# Patient Record
Sex: MALE | Race: WHITE
[De-identification: names, ages, dates, MRNs, and addresses within clinical notes are randomized per-mention and may not be internally consistent; named-entity substitution may affect disease eponyms.]

---

## 2021-01-06 ENCOUNTER — HOSPITAL ENCOUNTER (EMERGENCY)
Dept: HOSPITAL 9 - MADERS | Age: 81
Discharge: ANOTHER HOSPITAL | End: 2021-01-06
Payer: MEDICARE

## 2021-01-06 ENCOUNTER — HOSPITAL ENCOUNTER (INPATIENT)
Dept: HOSPITAL 92 - ERS | Age: 81
LOS: 12 days | Discharge: HOSPICE | DRG: 871 | End: 2021-01-18
Attending: INTERNAL MEDICINE | Admitting: STUDENT IN AN ORGANIZED HEALTH CARE EDUCATION/TRAINING PROGRAM
Payer: MEDICARE

## 2021-01-06 VITALS — BODY MASS INDEX: 23.2 KG/M2

## 2021-01-06 DIAGNOSIS — J12.82: ICD-10-CM

## 2021-01-06 DIAGNOSIS — I10: ICD-10-CM

## 2021-01-06 DIAGNOSIS — Z79.01: ICD-10-CM

## 2021-01-06 DIAGNOSIS — I21.A1: ICD-10-CM

## 2021-01-06 DIAGNOSIS — Z66: ICD-10-CM

## 2021-01-06 DIAGNOSIS — Z79.899: ICD-10-CM

## 2021-01-06 DIAGNOSIS — R13.10: ICD-10-CM

## 2021-01-06 DIAGNOSIS — I48.91: ICD-10-CM

## 2021-01-06 DIAGNOSIS — Z86.73: ICD-10-CM

## 2021-01-06 DIAGNOSIS — G93.41: ICD-10-CM

## 2021-01-06 DIAGNOSIS — F03.90: ICD-10-CM

## 2021-01-06 DIAGNOSIS — R65.20: ICD-10-CM

## 2021-01-06 DIAGNOSIS — E86.0: ICD-10-CM

## 2021-01-06 DIAGNOSIS — M19.90: ICD-10-CM

## 2021-01-06 DIAGNOSIS — I13.0: ICD-10-CM

## 2021-01-06 DIAGNOSIS — E87.5: ICD-10-CM

## 2021-01-06 DIAGNOSIS — N40.0: ICD-10-CM

## 2021-01-06 DIAGNOSIS — J96.01: ICD-10-CM

## 2021-01-06 DIAGNOSIS — I50.9: ICD-10-CM

## 2021-01-06 DIAGNOSIS — R73.9: ICD-10-CM

## 2021-01-06 DIAGNOSIS — Z88.8: ICD-10-CM

## 2021-01-06 DIAGNOSIS — I25.10: ICD-10-CM

## 2021-01-06 DIAGNOSIS — A41.89: Primary | ICD-10-CM

## 2021-01-06 DIAGNOSIS — R41.0: ICD-10-CM

## 2021-01-06 DIAGNOSIS — E43: ICD-10-CM

## 2021-01-06 DIAGNOSIS — N17.9: ICD-10-CM

## 2021-01-06 DIAGNOSIS — Z88.0: ICD-10-CM

## 2021-01-06 DIAGNOSIS — U07.1: Primary | ICD-10-CM

## 2021-01-06 DIAGNOSIS — N18.30: ICD-10-CM

## 2021-01-06 DIAGNOSIS — E87.0: ICD-10-CM

## 2021-01-06 DIAGNOSIS — Z51.5: ICD-10-CM

## 2021-01-06 DIAGNOSIS — U07.1: ICD-10-CM

## 2021-01-06 DIAGNOSIS — Z95.0: ICD-10-CM

## 2021-01-06 LAB
ALBUMIN SERPL BCG-MCNC: 3.5 G/DL (ref 3.4–4.8)
ALP SERPL-CCNC: 62 U/L (ref 40–110)
ALT SERPL W P-5'-P-CCNC: 64 U/L (ref 8–55)
ANION GAP SERPL CALC-SCNC: 17 MMOL/L (ref 10–20)
ANISOCYTOSIS BLD QL SMEAR: (no result) (100X)
APTT PPP: 35.8 SEC (ref 22.9–36.1)
AST SERPL-CCNC: 59 U/L (ref 5–34)
BACTERIA UR QL AUTO: (no result) HPF
BASOPHILS # BLD AUTO: 0 THOU/UL (ref 0–0.2)
BASOPHILS NFR BLD AUTO: 0.7 % (ref 0–1)
BILIRUB SERPL-MCNC: 0.7 MG/DL (ref 0.2–1.2)
BUN SERPL-MCNC: 48 MG/DL (ref 8.4–25.7)
CALCIUM SERPL-MCNC: 8 MG/DL (ref 7.8–10.44)
CHLORIDE SERPL-SCNC: 103 MMOL/L (ref 98–107)
CK MB SERPL-MCNC: 1.1 NG/ML (ref 0–6.6)
CK SERPL-CCNC: 106 U/L (ref 30–200)
CO2 SERPL-SCNC: 23 MMOL/L (ref 23–31)
CREAT CL PREDICTED SERPL C-G-VRATE: 0 ML/MIN (ref 70–130)
EOSINOPHIL # BLD AUTO: 0 THOU/UL (ref 0–0.7)
EOSINOPHIL NFR BLD AUTO: 0 % (ref 0–10)
GLOBULIN SER CALC-MCNC: 3.6 G/DL (ref 2.4–3.5)
GLUCOSE SERPL-MCNC: 169 MG/DL (ref 83–110)
HGB BLD-MCNC: 13.6 G/DL (ref 14–18)
INR PPP: 1.6
LIPASE SERPL-CCNC: 24 U/L (ref 8–78)
LYMPHOCYTES # BLD AUTO: 0 THOU/UL (ref 1.2–3.4)
LYMPHOCYTES NFR BLD AUTO: 7.4 % (ref 21–51)
MACROCYTES BLD QL SMEAR: (no result) (100X)
MCH RBC QN AUTO: 31.4 PG (ref 27–31)
MCV RBC AUTO: 96.3 FL (ref 78–98)
MICROCYTES BLD QL SMEAR: (no result) (100X)
MONOCYTES # BLD AUTO: 0.6 THOU/UL (ref 0.11–0.59)
MONOCYTES NFR BLD AUTO: 9.1 % (ref 0–10)
NEUTROPHILS # BLD AUTO: 5.8 THOU/UL (ref 1.4–6.5)
NEUTROPHILS NFR BLD AUTO: 82.7 % (ref 42–75)
PLATELET # BLD AUTO: 141 THOU/UL (ref 130–400)
POTASSIUM SERPL-SCNC: 4.1 MMOL/L (ref 3.5–5.1)
PROT UR STRIP.AUTO-MCNC: 100 MG/DL
PROTHROMBIN TIME: 19.2 SEC (ref 12–14.7)
RBC # BLD AUTO: 4.33 MILL/UL (ref 4.7–6.1)
RBC UR QL AUTO: (no result) HPF (ref 0–3)
SODIUM SERPL-SCNC: 139 MMOL/L (ref 136–145)
SP GR UR STRIP: 1.02 (ref 1–1.03)
WBC # BLD AUTO: 7 THOU/UL (ref 4.8–10.8)
WBC UR QL AUTO: (no result) HPF (ref 0–3)

## 2021-01-06 PROCEDURE — 36416 COLLJ CAPILLARY BLOOD SPEC: CPT

## 2021-01-06 PROCEDURE — 85610 PROTHROMBIN TIME: CPT

## 2021-01-06 PROCEDURE — 81001 URINALYSIS AUTO W/SCOPE: CPT

## 2021-01-06 PROCEDURE — 93010 ELECTROCARDIOGRAM REPORT: CPT

## 2021-01-06 PROCEDURE — 83605 ASSAY OF LACTIC ACID: CPT

## 2021-01-06 PROCEDURE — 85025 COMPLETE CBC W/AUTO DIFF WBC: CPT

## 2021-01-06 PROCEDURE — 80053 COMPREHEN METABOLIC PANEL: CPT

## 2021-01-06 PROCEDURE — 71045 X-RAY EXAM CHEST 1 VIEW: CPT

## 2021-01-06 PROCEDURE — 80048 BASIC METABOLIC PNL TOTAL CA: CPT

## 2021-01-06 PROCEDURE — 93005 ELECTROCARDIOGRAM TRACING: CPT

## 2021-01-06 PROCEDURE — 82553 CREATINE MB FRACTION: CPT

## 2021-01-06 PROCEDURE — 0240U: CPT

## 2021-01-06 PROCEDURE — 85730 THROMBOPLASTIN TIME PARTIAL: CPT

## 2021-01-06 PROCEDURE — 84484 ASSAY OF TROPONIN QUANT: CPT

## 2021-01-06 PROCEDURE — 87086 URINE CULTURE/COLONY COUNT: CPT

## 2021-01-06 PROCEDURE — 81003 URINALYSIS AUTO W/O SCOPE: CPT

## 2021-01-06 PROCEDURE — 51701 INSERT BLADDER CATHETER: CPT

## 2021-01-06 PROCEDURE — 87804 INFLUENZA ASSAY W/OPTIC: CPT

## 2021-01-06 PROCEDURE — 96367 TX/PROPH/DG ADDL SEQ IV INF: CPT

## 2021-01-06 PROCEDURE — 85027 COMPLETE CBC AUTOMATED: CPT

## 2021-01-06 PROCEDURE — 81015 MICROSCOPIC EXAM OF URINE: CPT

## 2021-01-06 PROCEDURE — 86140 C-REACTIVE PROTEIN: CPT

## 2021-01-06 PROCEDURE — 96365 THER/PROPH/DIAG IV INF INIT: CPT

## 2021-01-06 PROCEDURE — 84443 ASSAY THYROID STIM HORMONE: CPT

## 2021-01-06 PROCEDURE — 83735 ASSAY OF MAGNESIUM: CPT

## 2021-01-06 PROCEDURE — 94760 N-INVAS EAR/PLS OXIMETRY 1: CPT

## 2021-01-06 PROCEDURE — 82728 ASSAY OF FERRITIN: CPT

## 2021-01-06 PROCEDURE — 82550 ASSAY OF CK (CPK): CPT

## 2021-01-06 PROCEDURE — 87040 BLOOD CULTURE FOR BACTERIA: CPT

## 2021-01-06 PROCEDURE — 83690 ASSAY OF LIPASE: CPT

## 2021-01-06 PROCEDURE — 80202 ASSAY OF VANCOMYCIN: CPT

## 2021-01-06 PROCEDURE — 70450 CT HEAD/BRAIN W/O DYE: CPT

## 2021-01-06 PROCEDURE — 36415 COLL VENOUS BLD VENIPUNCTURE: CPT

## 2021-01-06 PROCEDURE — 83880 ASSAY OF NATRIURETIC PEPTIDE: CPT

## 2021-01-06 PROCEDURE — 96375 TX/PRO/DX INJ NEW DRUG ADDON: CPT

## 2021-01-06 NOTE — RAD
Exam: Chest one view



HISTORY:Altered mental status



Comparison: 3/17/2020



FINDINGS:

Cardiac silhouette:Cardiomegaly. Stable left-sided transvenous defibrillator.

Aorta: Atherosclerosis and slight elongation

Pulmonary vessels: Normal

Costophrenic angles: Clear



LUNGS: Scattered interstitial opacities throughout the lung parenchyma with a more focal consolidatio
n in the left upper lobe.

Pneumothorax: None



Osseous abnormalities: None



IMPRESSION: 

1. Left upper lobe pneumonia.

2. Scattered interstitial opacities may represent interstitial edema due to congestive heart failure.




Reported By: Akilah Lott 

Electronically Signed:  1/6/2021 9:32 PM

## 2021-01-06 NOTE — CT
Exam: Head CT without contrast





HISTORY: Fall today. Loss of consciousness. Altered mental status.



COMPARISON: 3/7/2017





FINDINGS:

Hemorrhage: No intraparenchymal hemorrhage or extra-axial hematoma.



Brain parenchyma: Cortical gray-white matter differentiation is preserved. No mass effect or midline 
shift. Basilar cisterns are patent.Age-appropriate atrophy. There are chronic small vessel ischemic

changes of the white matter. Remote lacunar infarct involving the posterior left corona radiata

Ventricular system: Ventricles and sulci are patent and symmetric.

Calvarium: Intact.

Sinuses and mastoid air cells: Adequate aeration.



IMPRESSION:



1. No intracranial posttraumatic sequelae

2. Chronic small vessel ischemic changes white matter. Age-appropriate atrophy.







Reported By: Akilah Lott 

Electronically Signed:  1/6/2021 9:35 PM

## 2021-01-07 LAB
ANION GAP SERPL CALC-SCNC: 19 MMOL/L (ref 10–20)
BASOPHILS # BLD AUTO: 0 THOU/UL (ref 0–0.2)
BASOPHILS NFR BLD AUTO: 0.2 % (ref 0–1)
BUN SERPL-MCNC: 48 MG/DL (ref 8.4–25.7)
CALCIUM SERPL-MCNC: 7.6 MG/DL (ref 7.8–10.44)
CHLORIDE SERPL-SCNC: 107 MMOL/L (ref 98–107)
CO2 SERPL-SCNC: 17 MMOL/L (ref 23–31)
CREAT CL PREDICTED SERPL C-G-VRATE: 34 ML/MIN (ref 70–130)
EOSINOPHIL # BLD AUTO: 0 THOU/UL (ref 0–0.7)
EOSINOPHIL NFR BLD AUTO: 0.2 % (ref 0–10)
GLUCOSE SERPL-MCNC: 179 MG/DL (ref 83–110)
HGB BLD-MCNC: 13.5 G/DL (ref 14–18)
LYMPHOCYTES # BLD: 0.5 THOU/UL (ref 1.2–3.4)
LYMPHOCYTES NFR BLD AUTO: 8.5 % (ref 21–51)
MCH RBC QN AUTO: 32.2 PG (ref 27–31)
MCV RBC AUTO: 98.9 FL (ref 78–98)
MONOCYTES # BLD AUTO: 0.2 THOU/UL (ref 0.11–0.59)
MONOCYTES NFR BLD AUTO: 3.4 % (ref 0–10)
NEUTROPHILS # BLD AUTO: 5.2 THOU/UL (ref 1.4–6.5)
NEUTROPHILS NFR BLD AUTO: 87.8 % (ref 42–75)
PLATELET # BLD AUTO: 125 THOU/UL (ref 130–400)
POTASSIUM SERPL-SCNC: 3.8 MMOL/L (ref 3.5–5.1)
RBC # BLD AUTO: 4.18 MILL/UL (ref 4.7–6.1)
SARS-COV-2 RNA RESP QL NAA+PROBE: DETECTED
SODIUM SERPL-SCNC: 139 MMOL/L (ref 136–145)
TROPONIN I SERPL DL<=0.01 NG/ML-MCNC: 0.16 NG/ML (ref ?–0.03)
TROPONIN I SERPL DL<=0.01 NG/ML-MCNC: 0.2 NG/ML (ref ?–0.03)
WBC # BLD AUTO: 6 THOU/UL (ref 4.8–10.8)

## 2021-01-07 PROCEDURE — 8E0ZXY6 ISOLATION: ICD-10-PCS | Performed by: EMERGENCY MEDICINE

## 2021-01-07 NOTE — PDOC.HHP
Hospitalist HPI





- History of Present Illness


Altered mental status


History of Present Illness: 





This is an 80-year-old male patient with a history of atrial fibrillation, 

hypertension, dementia Who was transferred from [facility] on account

of left upper lobe pneumonia and acute encephalopathy.  He also tested positive 

for Covid.  Patient is noted to have been recently discharged from [facility] with possible CVA.





Patient was taken by EMS from his home to [facility] on account of 

altered mental status.  He was noted to have had a fever and occasional cough 

and was diagnosed with urinary tract infection about a week ago.  At 

[place] was noted to be somnolent but arousable presenting blood pressure 

was 135/101, pulse 100, respiratory rate 20, temperature 103.8 and saturation 96

on 4 L oxygen.  His labs showed creatinine of 1.99 from baseline of 1.27, 

lactate was 2.4, troponin was 0.132.


CT brain showed no acute intracranial event, chest x-ray showed left upper lobe 

pneumonia and scattered interstitial opacities mainly representing interstitial 

edema due to CHF.  At [facility] he was given aspirin 300 mg rectally, 

vancomycin 20 mils per KG, cefepime 2 g and Decadron 10 mg.


He was transferred here for higher level care.





At presentation here his blood pressure was 140/98, pulse 98, respiratory rate 

28, temperature 99.2 and saturation 100% on 3 L oxygen.


At the time of my evaluation patient was in bed although responds to his name 

but does not answer any other question.


He has otherwise been generally stable.











Hospitalist ROS





- Review of Systems


ROS unobtainable: due to mental status





Hospitalist History





- Past Medical History


Other Medical History: 





atrial fibrillation, hypertension, dementia, CHF





- Past Surgical History


Other Surgical History: 





Tonsillectomy, vasectomy





- Family History


Family History: reports: no pertinent history





- Social History


Smoking Status: Unknown if ever smoked


Living Situation: With Family





- Exam


General Appearance: ill appearing


General - other findings: oriented only to self


Eye: PERRL, anicteric sclera


Heart: RRR, no murmur, no gallops, no rubs


Respiratory - other findings: Reduced air movemnt, occasional wheezes


Gastrointestinal: soft, non-distended, normal bowel sounds


Extremities: no cyanosis, no clubbing, no edema


Neurological: cranial nerve grossly intact, no focal deficits


Psychiatric: oriented to person, somnolent





Hospitalist Results





- Labs


Result Diagrams: 


                                 01/10/21 04:43





                                 01/10/21 04:43


Lab results: 


                                        











Troponin I  0.196 ng/mL (< 0.028)  H  01/07/21  01:09    














Hospitalist H&P A/P





- Plan


Plan: 





This is a 80-year-old male patient with a history of dementia, hypertension and 

atrial fibrillation transferred from [facility] on account of sepsis 

secondary to pneumonia in the setting of COVID-19 infection.





Acute hypoxic respiratory failure.


Patient saturating normally on 3 L.


This likely due to Covid pneumonia with possible CAP, mild CHF exacerbation


Currently received vancomycin and cefepime


Continue oxygen therapy


Pulmonology consult if deteriorates.





Severe sepsis


Patient has elevated troponin lactate, altered mental status, HERMAN with septic 

source being lung.


With tachypnea and fever


Received gentle hydration on account of history of heart failure. chest xray 

shows some congestion


We will trend lactate


Continue vancomycin and cefepime


Follow-up on culture





Pneumonia due to Covid


We will start steroids and vitamins


HERMAN precludes remdesivir.


We will monitor ferritin and CRP





HERMAN


Creatinine elevated at 1.99 from a baseline of 1.27-year ago.


Possibly prerenal


This could be chronic however.


Continue gentle hydration


Monitor BMP


Nephrology consult if deteriorates.





NSTEMI


Troponin elevated 0.132


This could be related to her renal function


No concerning EKG changes


We will trend troponin


Received aspirin


Consider cardiology consult in a.m.





-Acute encephalopathy


due to covid/sepsis


treat underlying


unclear what patients baseline is at the [facility]





Atrial fibrillation


Patient anticoagulated with apixaban


We will resume apixaban once verified.





-CHF exacerbation


mild


will hold lasix on account of sepsis


echocardiogram in am to determine EF


diurese if indicated





Dementia





-recent CVA





-Poor overall prognosis


due to reduced likelihood of survival, will consult palliative care for goals of

care discussion





VT prophylaxisrestart apixaban once verified


CODE STATUSto be discussed.


From his notes, patient's family is wary of intubation however more discussion 

have to be had


Consider palliative consult in a.m. for goals of care discussion

## 2021-01-07 NOTE — PDOC.BPN
- Brief Progress Note


Encounter Date: 01/07/21


Encounter Time: 16:00











F/u : COVID








The patient i slaying in bed, diuresed significantly.  He is on 2L of oxygen, 

nurse will attempt to wean. He did pass bedside swallow eval





He denies complaints but doesn't talk much 





General: alert, not very verbal


CVS: RRR, no murmurs, rubs, gallops


Lungs: CTAB


Abdomen: +BS, soft, nontender, nondistended


Extremities: no edema





Chest X ray: left upper lobe pnuemonia and heart failure





This is an 80 year old male who presented with altered mental status, found to 

have pneumonia








Acute hypoxic respiratory failure secondary to pneumonia and pulmonary edema


- continue IV cefepime.  BNP  > 3000. Chest Xray shows interstitial changes. 

Ordered 40 mg IV lasix x 1








HERMAN


- creatinine improved from 1.9 to 1.89. Repeat BMP tomorrow . Repeat UA





Elevated troponin


- mild elevation, downtrending. Could be from HERMAN. Will monitor








Hypertension 


- resume coreg





COVID+


- continue dexamethasone

## 2021-01-07 NOTE — RAD
Exam: Chest one view



HISTORY:Evaluate pulmonary edema. Follow-up exam.



Comparison: 1/6/2021



FINDINGS:

Cardiac silhouette:Stable cardiomegaly. Stable left-sided defibrillator.

Aorta: Stable atherosclerosis

Pulmonary vessels: Normal

Costophrenic angles: Clear



LUNGS: Stable multifocal interstitial and alveolar opacities.

Pneumothorax: None



Osseous abnormalities: None



IMPRESSION: No significant interval change.



Reported By: Akilah Lott 

Electronically Signed:  1/7/2021 7:09 PM

## 2021-01-08 LAB
ANION GAP SERPL CALC-SCNC: 19 MMOL/L (ref 10–20)
BASOPHILS # BLD AUTO: 0 THOU/UL (ref 0–0.2)
BASOPHILS NFR BLD AUTO: 0 % (ref 0–1)
BUN SERPL-MCNC: 51 MG/DL (ref 8.4–25.7)
CALCIUM SERPL-MCNC: 8.1 MG/DL (ref 7.8–10.44)
CHLORIDE SERPL-SCNC: 108 MMOL/L (ref 98–107)
CO2 SERPL-SCNC: 21 MMOL/L (ref 23–31)
CREAT CL PREDICTED SERPL C-G-VRATE: 31 ML/MIN (ref 70–130)
EOSINOPHIL # BLD AUTO: 0 THOU/UL (ref 0–0.7)
EOSINOPHIL NFR BLD AUTO: 0.2 % (ref 0–10)
GLUCOSE SERPL-MCNC: 228 MG/DL (ref 83–110)
HGB BLD-MCNC: 13.3 G/DL (ref 14–18)
LYMPHOCYTES # BLD: 0.4 THOU/UL (ref 1.2–3.4)
LYMPHOCYTES NFR BLD AUTO: 3.1 % (ref 21–51)
MCH RBC QN AUTO: 32.2 PG (ref 27–31)
MCV RBC AUTO: 98 FL (ref 78–98)
MONOCYTES # BLD AUTO: 0.8 THOU/UL (ref 0.11–0.59)
MONOCYTES NFR BLD AUTO: 5.5 % (ref 0–10)
NEUTROPHILS # BLD AUTO: 12.5 THOU/UL (ref 1.4–6.5)
NEUTROPHILS NFR BLD AUTO: 91.2 % (ref 42–75)
PLATELET # BLD AUTO: 138 THOU/UL (ref 130–400)
POTASSIUM SERPL-SCNC: 3.6 MMOL/L (ref 3.5–5.1)
PROT UR STRIP.AUTO-MCNC: 100 MG/DL
RBC # BLD AUTO: 4.15 MILL/UL (ref 4.7–6.1)
RBC UR QL AUTO: (no result) HPF (ref 0–3)
SODIUM SERPL-SCNC: 144 MMOL/L (ref 136–145)
SP GR UR STRIP: 1.02 (ref 1–1.04)
WBC # BLD AUTO: 13.7 THOU/UL (ref 4.8–10.8)
WBC UR QL AUTO: (no result) HPF (ref 0–3)

## 2021-01-08 NOTE — PDOC.HOSPP
- Subjective


Encounter Date: 01/08/21


Encounter Time: 16:58


Subjective: 





F/u : COVID








The patient has been weaned down to 1L nasal cannula. He is more alert.  His 

diet has been upgraded to a pureed diet and he was seen sitting in bed 

comfortably eating. 





He is non-verbal .  He denies pain 








- Objective


Vital Signs & Weight: 


                             Vital Signs (12 hours)











  Temp Pulse Resp BP Pulse Ox


 


 01/08/21 16:20  98 F  108 H  24 H  131/85  100


 


 01/08/21 11:28  97.6 F  95  22 H  147/89 H  100


 


 01/08/21 09:10      100


 


 01/08/21 07:37  98.8 F  100  18  125/85  100


 


 01/08/21 07:27  98.8 F  100  18  125/85  100


 


 01/08/21 05:20  99.9 F H    








                                     Weight











Admit Weight                   174 lb


 


Weight                         174 lb














I&O: 


                                        











 01/07/21 01/08/21 01/09/21





 06:59 06:59 06:59


 


Intake Total  240 


 


Balance  240 











Result Diagrams: 


                                 01/08/21 04:32





                                 01/08/21 04:32





Hospitalist ROS





- Review of Systems


Constitutional: denies: fever, chills





- Medication


Medications: 


Active Medications











Generic Name Dose Route Start Last Admin





  Trade Name Freq  PRN Reason Stop Dose Admin


 


Acetaminophen  650 mg  01/08/21 03:35  01/08/21 03:44





  Acetaminophen 325 Mg Tab  PO   650 mg





  Q6H PRN   Administration





  Fever/Mild Pain  


 


Carvedilol  6.25 mg  01/07/21 17:00  01/08/21 16:11





  Carvedilol 6.25 Mg Tab  PO   6.25 mg





  BID-WM JULIO   Administration


 


Dexamethasone  8 mg  01/07/21 09:00  01/08/21 09:10





  Dexamethasone 4 Mg/Ml Vial  SLOW IVP   8 mg





  DAILY JULIO   Administration














- Exam


General Appearance: NAD, awake alert


Eye: PERRL, anicteric sclera


ENT: normocephalic atraumatic, no oropharyngeal lesions


Neck: no JVD


Heart: RRR, no murmur, no gallops, no rubs


Respiratory: CTAB, no wheezes, no rales, no ronchi


Respiratory - other findings: on oxygen 1L 


Gastrointestinal: soft, non-tender, non-distended, normal bowel sounds


Extremities: no cyanosis, no clubbing, no edema


Skin: normal turgor, no lesions, no rashes


Neurological: cranial nerve grossly intact, normal sensation to touch, no 

weakness


Musculoskeletal: normal tone, normal strength, no muscle wasting


Psychiatric: A&O x 3





Hosp A/P





- Plan











This is an 80 year old male who presented to the hospital with hypoxia and 

altered mental status. He is COVID+





Acute hypoxic respiratory failure secondary to COVID  pneumonia and pulmonary 

edema


- continue IV cefepime.  BNP  > 3000. Chest Xray shows interstitial changes. 

Ordered 40 mg IV lasix x 1 on 1/7


- I have weaned him down to 0.5L  nasal cannula 








HERMAN


- creatinine worsened to 2.0.  He is not eating much so will order IV fluids at 

low level 





Dysphagia


- speech has upgraded him to a pureed diet








Elevated troponin


- mild elevation, downtrending. No chest pain, will monitor 











Hypertension  - controlled


- continue coreg





COVID+


- continue dexamethasone

## 2021-01-09 LAB
ANION GAP SERPL CALC-SCNC: 15 MMOL/L (ref 10–20)
ANION GAP SERPL CALC-SCNC: 16 MMOL/L (ref 10–20)
BUN SERPL-MCNC: 55 MG/DL (ref 8.4–25.7)
BUN SERPL-MCNC: 57 MG/DL (ref 8.4–25.7)
CALCIUM SERPL-MCNC: 8.3 MG/DL (ref 7.8–10.44)
CALCIUM SERPL-MCNC: 8.4 MG/DL (ref 7.8–10.44)
CHLORIDE SERPL-SCNC: 114 MMOL/L (ref 98–107)
CHLORIDE SERPL-SCNC: 116 MMOL/L (ref 98–107)
CO2 SERPL-SCNC: 22 MMOL/L (ref 23–31)
CO2 SERPL-SCNC: 23 MMOL/L (ref 23–31)
CREAT CL PREDICTED SERPL C-G-VRATE: 31 ML/MIN (ref 70–130)
CREAT CL PREDICTED SERPL C-G-VRATE: 32 ML/MIN (ref 70–130)
GLUCOSE SERPL-MCNC: 209 MG/DL (ref 83–110)
GLUCOSE SERPL-MCNC: 253 MG/DL (ref 83–110)
HGB BLD-MCNC: 12.6 G/DL (ref 14–18)
MCH RBC QN AUTO: 31.8 PG (ref 27–31)
MCV RBC AUTO: 97.1 FL (ref 78–98)
PLATELET # BLD AUTO: 130 THOU/UL (ref 130–400)
POTASSIUM SERPL-SCNC: 3.2 MMOL/L (ref 3.5–5.1)
POTASSIUM SERPL-SCNC: 3.5 MMOL/L (ref 3.5–5.1)
RBC # BLD AUTO: 3.97 MILL/UL (ref 4.7–6.1)
SODIUM SERPL-SCNC: 149 MMOL/L (ref 136–145)
SODIUM SERPL-SCNC: 150 MMOL/L (ref 136–145)
WBC # BLD AUTO: 11.5 THOU/UL (ref 4.8–10.8)

## 2021-01-09 RX ADMIN — Medication SCH ML: at 22:13

## 2021-01-09 RX ADMIN — VANCOMYCIN HYDROCHLORIDE SCH MLS: 1 INJECTION, SOLUTION INTRAVENOUS at 12:35

## 2021-01-09 NOTE — RAD
Exam: Chest one view



HISTORY:Tachypnea. COVID patient



Comparison: 1/7/2021



FINDINGS:

Cardiac silhouette:Cardiomegaly. Stable dual lead left-sided transvenous defibrillator.

Aorta: Elongated

Pulmonary vessels: Normal

Costophrenic angles: Clear



LUNGS: Stable multifocal interstitial and alveolar opacities.

Pneumothorax: None



Osseous abnormalities: None



IMPRESSION: No significant interval change. Multi lobar COVID pneumonia.



Reported By: Akilah Lott 

Electronically Signed:  1/9/2021 1:49 PM

## 2021-01-09 NOTE — PDOC.HOSPP
- Subjective


Encounter Date: 01/09/21


Encounter Time: 11:00


Subjective: 





F/u: COVID








 The patient is non-verbal and not answering any questions





Per nursing, he did not do well with pureed diet this morning. Speech to re-

evaluate. I have asked nursing to try to wean his oxygen. 





- Objective


Vital Signs & Weight: 


                             Vital Signs (12 hours)











  Temp Pulse Resp BP Pulse Ox


 


 01/09/21 10:30  98.3 F  118 H  20  129/87  97


 


 01/09/21 03:15  98.2 F  107 H  28 H  134/84  98








                                     Weight











Admit Weight                   174 lb


 


Weight                         174 lb














I&O: 


                                        











 01/08/21 01/09/21 01/10/21





 06:59 06:59 06:59


 


Intake Total 240 240 


 


Output Total  50 


 


Balance 240 190 











Result Diagrams: 


                                 01/09/21 04:42





                                 01/09/21 13:34





Hospitalist ROS





- Review of Systems


Constitutional: denies: fever, chills





- Medication


Medications: 


Active Medications











Generic Name Dose Route Start Last Admin





  Trade Name Freq  PRN Reason Stop Dose Admin


 


Acetaminophen  650 mg  01/08/21 03:35  01/09/21 10:06





  Acetaminophen 325 Mg Tab  PO   650 mg





  Q6H PRN   Administration





  Fever/Mild Pain  


 


Carvedilol  6.25 mg  01/07/21 17:00  01/09/21 10:06





  Carvedilol 6.25 Mg Tab  PO   6.25 mg





  BID-WM JULIO   Administration


 


Dexamethasone  8 mg  01/07/21 09:00  01/09/21 10:07





  Dexamethasone 4 Mg/Ml Vial  SLOW IVP   8 mg





  DAILY JULIO   Administration


 


Cefepime HCl 2 gm/ Sodium  100 mls @ 200 mls/hr  01/09/21 09:00  01/09/21 10:07





  Chloride  IVPB   100 mls





  0900 JULIO   Administration


 


Dextrose/Water  1,000 mls @ 75 mls/hr  01/09/21 10:00  01/09/21 10:08





  D5w  IV   1,000 mls





  [id number] JULIO   Administration


 


Vancomycin HCl 1 gm/ Device  200 mls @ 200 mls/hr  01/09/21 14:00  01/09/21 

12:35





  IVPB   200 mls





  Q12H JULIO   Administration














- Exam


General Appearance: NAD


General - other findings: drowsy, on oxygen, non-verbal


Eye: PERRL, anicteric sclera


ENT: normocephalic atraumatic, no oropharyngeal lesions


Neck: no JVD


Heart: RRR, no murmur, no gallops, no rubs


Respiratory: CTAB, no wheezes, no rales, no ronchi


Gastrointestinal: soft, non-tender, non-distended, normal bowel sounds


Extremities: no cyanosis, no clubbing, no edema


Skin: normal turgor, no lesions, no rashes


Neurological: cranial nerve grossly intact, normal sensation to touch, no 

weakness





Hosp A/P





- Plan





Chest x ray 1/9: stable changes








This is an 80 year old male who presented to the hospital with hypoxia and 

altered mental status. He is COVID+








Acute hypoxic respiratory failure secondary to COVID  pneumonia and pulmonary 

edema


- continue IV cefepime.  BNP  > 3000. Chest Xray shows interstitial changes. 

Ordered 40 mg IV lasix x 1 on 1/7


- he is on 3L nasal cannula saturating 100%. Attempt to wean 





Dementia? 


- patient non-verbal, refusing to eat. Palliative care has been consulted 

regarding goals of care, possibly hospice ?








Hypernatremia


- sodium worsened to 150. Will switch fluids to D5W 








HERMAN


- creatinine is still 2. Continue D5W





Dysphagia


- speech has upgraded him to a pureed diet but probably needs to be re-evaluated








Elevated troponin


- mild elevation, downtrending. No chest pain, will monitor 











Hypertension  - controlled


- continue coreg





COVID+


- continue dexamethasone

## 2021-01-10 LAB
ANION GAP SERPL CALC-SCNC: 15 MMOL/L (ref 10–20)
BUN SERPL-MCNC: 56 MG/DL (ref 8.4–25.7)
CALCIUM SERPL-MCNC: 8.5 MG/DL (ref 7.8–10.44)
CHLORIDE SERPL-SCNC: 117 MMOL/L (ref 98–107)
CO2 SERPL-SCNC: 22 MMOL/L (ref 23–31)
CREAT CL PREDICTED SERPL C-G-VRATE: 32 ML/MIN (ref 70–130)
GLUCOSE SERPL-MCNC: 220 MG/DL (ref 83–110)
HGB BLD-MCNC: 12.6 G/DL (ref 14–18)
MCH RBC QN AUTO: 31.3 PG (ref 27–31)
MCV RBC AUTO: 97.9 FL (ref 78–98)
PLATELET # BLD AUTO: 129 THOU/UL (ref 130–400)
POTASSIUM SERPL-SCNC: 3.9 MMOL/L (ref 3.5–5.1)
RBC # BLD AUTO: 4.04 MILL/UL (ref 4.7–6.1)
SODIUM SERPL-SCNC: 150 MMOL/L (ref 136–145)
WBC # BLD AUTO: 11 THOU/UL (ref 4.8–10.8)

## 2021-01-10 RX ADMIN — HEPARIN SODIUM SCH UNITS: 5000 INJECTION, SOLUTION INTRAVENOUS; SUBCUTANEOUS at 20:53

## 2021-01-10 RX ADMIN — VANCOMYCIN HYDROCHLORIDE SCH MLS: 1 INJECTION, SOLUTION INTRAVENOUS at 02:00

## 2021-01-10 RX ADMIN — Medication SCH ML: at 10:26

## 2021-01-10 RX ADMIN — Medication SCH ML: at 20:52

## 2021-01-10 RX ADMIN — INSULIN LISPRO PRN UNITS: 100 INJECTION, SOLUTION INTRAVENOUS; SUBCUTANEOUS at 17:35

## 2021-01-10 RX ADMIN — HEPARIN SODIUM SCH UNITS: 5000 INJECTION, SOLUTION INTRAVENOUS; SUBCUTANEOUS at 14:52

## 2021-01-10 NOTE — PDOC.HOSPP
- Subjective


Encounter Date: 01/10/21


non-verbal





- Objective


Vital Signs & Weight: 


                             Vital Signs (12 hours)











  Temp Pulse Resp BP Pulse Ox


 


 01/10/21 12:33  99.4 F    


 


 01/10/21 12:00  100.8 F H  126 H  36 H  135/63  92 L


 


 01/10/21 11:05      95


 


 01/10/21 10:15  101.8 F H  126 H  32 H  130/80  86 L


 


 01/10/21 05:13      96


 


 01/10/21 03:54  98.5 F    








                                     Weight











Admit Weight                   174 lb


 


Weight                         174 lb














I&O: 


                                        











 01/09/21 01/10/21 01/11/21





 06:59 06:59 06:59


 


Intake Total 240 2224 


 


Output Total 50  


 


Balance 190 2224 











Result Diagrams: 


                                 01/10/21 04:43





                                 01/10/21 04:43





Hospitalist ROS





- Medication


Medications: 


Active Medications











Generic Name Dose Route Start Last Admin





  Trade Name Freq  PRN Reason Stop Dose Admin


 


Acetaminophen  650 mg  01/09/21 16:02  01/10/21 02:12





  Acetaminophen 325 Mg Tab  PO   650 mg





  Q4H PRN   Administration





  Fever/Mild Pain  


 


Acetaminophen  325 mg  01/10/21 10:49  01/10/21 12:03





  Acetaminophen 325 Mg Suppository  SC   325 mg





  Q4H PRN   Administration





  Headache/Fever or Pain  


 


Carvedilol  6.25 mg  01/07/21 17:00  01/09/21 16:16





  Carvedilol 6.25 Mg Tab  PO   6.25 mg





  BID-WM JULIO   Administration


 


Dexamethasone  6 mg  01/10/21 09:00  01/10/21 10:25





  Dexamethasone 4 Mg/Ml Vial  SLOW IVP   6 mg





  DAILY JULIO   Administration


 


Dextrose/Water  1,000 mls @ 75 mls/hr  01/09/21 10:00  01/10/21 04:22





  D5w  IV   1,000 mls





  [id number] JULIO   Administration


 


Sodium Chloride  10 ml  01/09/21 21:00  01/10/21 10:26





  Flush - Normal Saline 10 Ml Syringe  IVF   10 ml





  Q12HR JULIO   Administration














- Exam


General - other findings: Patient is nonresponsive.  Right side fetal position. 

Spontaneous movement


Heart: no murmur, irregular


Heart - other findings: Tachycardia


Respiratory: CTAB, no wheezes, no rales, no ronchi


Gastrointestinal: soft, non-distended, normal bowel sounds


Extremities: no cyanosis, no clubbing, no edema


Skin: normal turgor


Psychiatric: not oriented





Hosp A/P


(1) Acute respiratory failure with hypoxia


Code(s): J96.01 - ACUTE RESPIRATORY FAILURE WITH HYPOXIA   Status: Acute   





(2) Pneumonia due to COVID-19 virus


Code(s): U07.1 - COVID-19; J12.82 - PNEUMONIA DUE TO CORONAVIRUS DISEASE 2019   

Status: Acute   





(3) Dementia


Code(s): F03.90 - UNSPECIFIED DEMENTIA WITHOUT BEHAVIORAL DISTURBANCE   Status: 

Acute   





(4) Hypernatremia


Code(s): E87.0 - HYPEROSMOLALITY AND HYPERNATREMIA   Status: Acute   





(5) Dysphagia


Code(s): R13.10 - DYSPHAGIA, UNSPECIFIED   Status: Acute   





(6) Hypertension


Code(s): I10 - ESSENTIAL (PRIMARY) HYPERTENSION   Status: Acute   





(7) Acute worsening of stage 3 chronic kidney disease


Code(s): N18.30 - CHRONIC KIDNEY DISEASE, STAGE 3 UNSPECIFIED   Status: Acute   





(8) Atrial fibrillation with rapid ventricular response


Code(s): I48.91 - UNSPECIFIED ATRIAL FIBRILLATION   Status: Acute   





(9) Sepsis


Code(s): A41.9 - SEPSIS, UNSPECIFIED ORGANISM   Status: Acute   





- Plan





Acute hypoxic respiratory failure:


Secondary to COVID-19 pneumonia.


Continue supplemental oxygen therapy.


Patient does not have the capacity use multidose inhalers and we are not using 

nebs for Covid positive patients.





COVID-19 pneumonia:


Remdesivir contraindicated due to his renal function.


Continue dexamethasone and vitamin therapy.


Continue to follow inflammatory markers.


Initial CRP was 1.37, ferritin 660, D-dimer 1.6





Sepsis:


Secondary to COVID-19 pneumonia.


Manifest with acute kidney injury.


No further evidence of bacterial infection.


We will discontinue the vancomycin and cefepime.





Acute kidney injury:


We do have baseline numbers on this patient with regards to his renal function.


His GFR in 2019 was around 55.


Presented here with a GFR in the low 30s.


It is unclear if it had deteriorated between then and now and this is his new 

baseline or if this is truly acute.


Has remained fairly stable since his admission here.





Atrial fibrillation with rapid ventricular response:


Patient has p.o. Coreg but is unable to adequately take p.o.'s.


Give IV metoprolol 5 mg x 1.


He is not likely capable of taking the p.o. Eliquis at this time.


We will give heparin in light of his renal function.





Hyperglycemia:


Likely secondary to the steroids.  We will add sliding scale insulin.





NSTEMI type II:


Likely secondary to hypoxia.


Given the patient's age and his renal functions difficult to know how to assess 

these but these may be his baseline numbers.


No additional aggressive intervention indicated.





Encephalopathy:


Patient appears to have some baseline dementia.  Nurses were discussing with his

daughter today and they indicated he was typically only giving occasional one-

word answers.


This could be related to his acute infection.


Dementia:


Stable





Dysphagia:


Possibly due to the patient's encephalopathy and dementia.


Speech has recommended pured diet but will continue to evaluate.

## 2021-01-11 RX ADMIN — INSULIN LISPRO PRN UNITS: 100 INJECTION, SOLUTION INTRAVENOUS; SUBCUTANEOUS at 18:11

## 2021-01-11 RX ADMIN — HEPARIN SODIUM SCH UNITS: 5000 INJECTION, SOLUTION INTRAVENOUS; SUBCUTANEOUS at 15:57

## 2021-01-11 RX ADMIN — Medication SCH ML: at 07:34

## 2021-01-11 RX ADMIN — HEPARIN SODIUM SCH UNITS: 5000 INJECTION, SOLUTION INTRAVENOUS; SUBCUTANEOUS at 07:33

## 2021-01-11 RX ADMIN — HEPARIN SODIUM SCH UNITS: 5000 INJECTION, SOLUTION INTRAVENOUS; SUBCUTANEOUS at 21:20

## 2021-01-11 RX ADMIN — Medication SCH ML: at 23:06

## 2021-01-11 RX ADMIN — INSULIN LISPRO PRN UNITS: 100 INJECTION, SOLUTION INTRAVENOUS; SUBCUTANEOUS at 06:38

## 2021-01-11 NOTE — PDOC.HOSPP
- Subjective


Encounter Date: 01/11/21


Encounter Time: 09:45


Subjective: 


Patient currently on nonrebreather using accessory muscles.





- Objective


Vital Signs & Weight: 


                             Vital Signs (12 hours)











  Temp Pulse Resp BP BP Pulse Ox


 


 01/11/21 11:00  98.7 F  109 H  46 H  164/94 H   98


 


 01/11/21 08:00  98.3 F  128 H  46 H   156/98 H  93 L


 


 01/11/21 04:41       95


 


 01/11/21 03:40   101 H    144/91 H 


 


 01/11/21 03:23  99.4 F  132 H  21 H   137/96 H  92 L








                                     Weight











Admit Weight                   174 lb


 


Weight                         169 lb 4 oz














I&O: 


                                        











 01/10/21 01/11/21 01/12/21





 06:59 06:59 06:59


 


Intake Total 2224 1929 


 


Balance 2224 1929 











Result Diagrams: 


                                 01/10/21 04:43





                                 01/10/21 04:43


Additional Labs: 


                                   Accuchecks











  01/11/21 01/11/21 01/10/21





  10:59 06:16 20:48


 


POC Glucose  198 H  252 H  217 H














  01/10/21





  17:03


 


POC Glucose  236 H














Hospitalist ROS





- Review of Systems


Other: 





Unable to obtain.





- Medication


Medications: 


Active Medications











Generic Name Dose Route Start Last Admin





  Trade Name Freq  PRN Reason Stop Dose Admin


 


Acetaminophen  650 mg  01/09/21 16:02  01/10/21 02:12





  Acetaminophen 325 Mg Tab  PO   650 mg





  Q4H PRN   Administration





  Fever/Mild Pain  


 


Acetaminophen  325 mg  01/10/21 10:49  01/10/21 12:03





  Acetaminophen 325 Mg Suppository  TN   325 mg





  Q4H PRN   Administration





  Headache/Fever or Pain  


 


Carvedilol  6.25 mg  01/07/21 17:00  01/11/21 08:05





  Carvedilol 6.25 Mg Tab  PO   Not Given





  BID-WM JULIO  


 


Dexamethasone  6 mg  01/10/21 09:00  01/11/21 07:33





  Dexamethasone 4 Mg/Ml Vial  SLOW IVP   6 mg





  DAILY JULIO   Administration


 


Heparin Sodium (Porcine)  5,000 units  01/10/21 15:00  01/11/21 07:33





  Heparin 5,000 Units/Ml Vial  SC   5,000 units





  TID JULIO   Administration


 


Dextrose/Water  1,000 mls @ 75 mls/hr  01/09/21 10:00  01/11/21 07:31





  D5w  IV   1,000 mls





  .[id number] JULIO   Administration


 


Diltiazem HCl 125 mg/ Sodium  125 mls @ 5 mls/hr  01/11/21 09:00  01/11/21 09:49





  Chloride  IVPB   125 mls





  INF JULIO   Administration





  Protocol  





  5 MG/HR  


 


Insulin Human Lispro  0 units  01/10/21 14:35  01/11/21 06:38





  Humalog 300 Units/3 Ml Vial  SC   4 units





  .MILD SLIDING SCALE PRN   Administration





  Mild Correctional Scale  


 


Metoprolol Tartrate  5 mg  01/11/21 01:57  01/11/21 03:09





  Metoprolol Tartrate 5 Mg/5 Ml Vial  IVP   5 mg





  Q6H PRN   Administration





  To Control Heart Rate  


 


Sodium Chloride  10 ml  01/09/21 21:00  01/11/21 07:34





  Flush - Normal Saline 10 Ml Syringe  IVF   10 ml





  Q12HR JULIO   Administration














- Exam


Neck: negative: supple, symmetric, no JVD, no thyromegaly, no lymphadenopathy, 

no carotid bruit, JVD


Heart: negative: RRR, no murmur, no gallops, no rubs, normal peripheral pulses, 

irregular, diminshed peripheral pulses, murmur present, II/IV, III/IV


Respiratory: no ronchi


Gastrointestinal: negative: soft, non-tender, non-distended, normal bowel 

sounds, no palpable masses, no hepatomegaly, no splenomegaly, no bruit, no g

uarding, no rigidity, tender to palpation, distended, diminished bowl sounds, 

voluntary guarding





Hosp A/P


(1) COVID-19


Code(s): U07.1 - COVID-19   Status: Acute   





(2) Acute respiratory failure with hypoxia


Code(s): J96.01 - ACUTE RESPIRATORY FAILURE WITH HYPOXIA   Status: Acute   





(3) Atrial fibrillation with rapid ventricular response


Code(s): I48.91 - UNSPECIFIED ATRIAL FIBRILLATION   Status: Acute   





(4) Dementia


Code(s): F03.90 - UNSPECIFIED DEMENTIA WITHOUT BEHAVIORAL DISTURBANCE   Status: 

Acute   





(5) Dysphagia


Code(s): R13.10 - DYSPHAGIA, UNSPECIFIED   Status: Acute   





(6) Hypertension


Code(s): I10 - ESSENTIAL (PRIMARY) HYPERTENSION   Status: Acute   





(7) Hypernatremia


Code(s): E87.0 - HYPEROSMOLALITY AND HYPERNATREMIA   Status: Acute   





(8) Protein calorie malnutrition


Code(s): E46 - UNSPECIFIED PROTEIN-CALORIE MALNUTRITION   Status: Acute   





- Plan





Patient currently on D5 water.  We will also start patient on PPN.  Family c

alled both patient's daughters called updated.  Patient's wife also called 

however she did not  the phone so left a message.  Explained in details 

about patient's overall poor prognosis and it seems that patient might be headed

for intubation which would be futile.  Patient is severely deconditioned has not

been eating.  We will also consult palliative care.  Attempts were made to call 

patient's wife.  We will start patient on a Cardizem drip.

## 2021-01-11 NOTE — PDOC.FMACP
Advance Care Planning





- Problem


(1) Palliative care encounter


Status: Acute   Code(s): Z51.5 - ENCOUNTER FOR PALLIATIVE CARE   





(2) Acute respiratory failure with hypoxia


Status: Acute   Code(s): J96.01 - ACUTE RESPIRATORY FAILURE WITH HYPOXIA   





(3) Atrial fibrillation with rapid ventricular response


Status: Acute   Code(s): I48.91 - UNSPECIFIED ATRIAL FIBRILLATION   





(4) COVID-19


Status: Acute   Code(s): U07.1 - COVID-19   





(5) Dementia


Status: Acute   Code(s): F03.90 - UNSPECIFIED DEMENTIA WITHOUT BEHAVIORAL 

DISTURBANCE   





(6) Dysphagia


Status: Acute   Code(s): R13.10 - DYSPHAGIA, UNSPECIFIED   





(7) Protein calorie malnutrition


Status: Acute   Code(s): E46 - UNSPECIFIED PROTEIN-CALORIE MALNUTRITION   





- Note


Participants: family, palliative care


Summary: 


Palliative care has addressed Advanced Care Planning with family.  The 

diagnosis, prognosis and goals of care were discussed.  Appropriate forms and 

documentation to accomplish the goals of care were discussed.  All questions 

were answered.  








Family initially thought MPOA was in place, confirmed that it was not.


Wife is surrogate decision maker as per [place] [employer]. 


Elected to transition to [facility].





Palliative care will revisit Goals of care in relation to Goal of care with 

known chronic morbidities paired with Covid 19.


Poor prognosis. 





Please also refer to Palliative care notes in note section.


Time Spent (mins): 20

## 2021-01-12 LAB
ALBUMIN SERPL BCG-MCNC: 3.1 G/DL (ref 3.4–4.8)
ALP SERPL-CCNC: 71 U/L (ref 40–110)
ALT SERPL W P-5'-P-CCNC: 68 U/L (ref 8–55)
ANION GAP SERPL CALC-SCNC: 15 MMOL/L (ref 10–20)
AST SERPL-CCNC: 59 U/L (ref 5–34)
BILIRUB SERPL-MCNC: 1.1 MG/DL (ref 0.2–1.2)
BUN SERPL-MCNC: 75 MG/DL (ref 8.4–25.7)
CALCIUM SERPL-MCNC: 8.4 MG/DL (ref 7.8–10.44)
CHLORIDE SERPL-SCNC: 113 MMOL/L (ref 98–107)
CO2 SERPL-SCNC: 20 MMOL/L (ref 23–31)
CREAT CL PREDICTED SERPL C-G-VRATE: 25 ML/MIN (ref 70–130)
GLOBULIN SER CALC-MCNC: 3.5 G/DL (ref 2.4–3.5)
GLUCOSE SERPL-MCNC: 307 MG/DL (ref 83–110)
HGB BLD-MCNC: 14.6 G/DL (ref 14–18)
MAGNESIUM SERPL-MCNC: 2.9 MG/DL (ref 1.6–2.6)
MCH RBC QN AUTO: 32.3 PG (ref 27–31)
MCV RBC AUTO: 98.8 FL (ref 78–98)
MDIFF COMPLETE?: YES
PLATELET # BLD AUTO: 142 THOU/UL (ref 130–400)
POLYCHROMASIA BLD QL SMEAR: (no result) (100X)
POTASSIUM SERPL-SCNC: 4.3 MMOL/L (ref 3.5–5.1)
RBC # BLD AUTO: 4.51 MILL/UL (ref 4.7–6.1)
SODIUM SERPL-SCNC: 144 MMOL/L (ref 136–145)
WBC # BLD AUTO: 17.2 THOU/UL (ref 4.8–10.8)

## 2021-01-12 RX ADMIN — INSULIN LISPRO PRN UNIT: 100 INJECTION, SOLUTION INTRAVENOUS; SUBCUTANEOUS at 21:29

## 2021-01-12 RX ADMIN — HEPARIN SODIUM SCH UNITS: 5000 INJECTION, SOLUTION INTRAVENOUS; SUBCUTANEOUS at 20:03

## 2021-01-12 RX ADMIN — INSULIN LISPRO PRN UNIT: 100 INJECTION, SOLUTION INTRAVENOUS; SUBCUTANEOUS at 00:24

## 2021-01-12 RX ADMIN — LEUCINE, PHENYLALANINE, LYSINE, METHIONINE, ISOLEUCINE, VALINE, HISTIDINE, THREONINE, TRYPTOPHAN, ALANINE, GLYCINE, ARGININE, PROLINE, SERINE, TYROSINE, SODIUM ACETATE, DIBASIC POTASSIUM PHOSPHATE, MAGNESIUM CHLORIDE, SODIUM CHLORIDE, CALCIUM CHLORIDE, DEXTROSE SCH MLS
311; 238; 247; 170; 255; 247; 204; 179; 77; 880; 438; 489; 289; 213; 17; 297; 261; 51; 77; 33; 5 INJECTION INTRAVENOUS at 21:30

## 2021-01-12 RX ADMIN — INSULIN LISPRO PRN UNITS: 100 INJECTION, SOLUTION INTRAVENOUS; SUBCUTANEOUS at 05:33

## 2021-01-12 RX ADMIN — Medication SCH ML: at 07:48

## 2021-01-12 RX ADMIN — Medication SCH ML: at 20:04

## 2021-01-12 RX ADMIN — INSULIN LISPRO PRN UNITS: 100 INJECTION, SOLUTION INTRAVENOUS; SUBCUTANEOUS at 16:17

## 2021-01-12 RX ADMIN — HEPARIN SODIUM SCH UNITS: 5000 INJECTION, SOLUTION INTRAVENOUS; SUBCUTANEOUS at 14:16

## 2021-01-12 RX ADMIN — INSULIN LISPRO PRN UNITS: 100 INJECTION, SOLUTION INTRAVENOUS; SUBCUTANEOUS at 11:30

## 2021-01-12 RX ADMIN — HEPARIN SODIUM SCH UNITS: 5000 INJECTION, SOLUTION INTRAVENOUS; SUBCUTANEOUS at 07:48

## 2021-01-12 NOTE — PDOC.HOSPP
- Subjective


Encounter Date: 01/12/21


Encounter Time: 10:30


Subjective: 


Patient up in bed appears confused.





- Objective


Vital Signs & Weight: 


                             Vital Signs (12 hours)











  Temp Pulse Resp BP Pulse Ox


 


 01/12/21 10:35  98.9 F  106 H  40 H  158/82 H  94 L


 


 01/12/21 07:30  99.3 F  116 H  45 H  166/95 H  92 L


 


 01/12/21 07:10  99.3 F  116 H  45 H  166/95 H  92 L


 


 01/12/21 05:05      97


 


 01/12/21 03:36  99.1 F  97  20  130/71  97








                                     Weight











Admit Weight                   174 lb


 


Weight                         169 lb 4 oz














I&O: 


                                        











 01/11/21 01/12/21 01/13/21





 06:59 06:59 06:59


 


Intake Total 1929 1896 


 


Balance 1929 1896 











Result Diagrams: 


                                 01/12/21 10:32





                                 01/12/21 10:32


Additional Labs: 


                                   Accuchecks











  01/12/21 01/12/21 01/11/21





  10:36 05:11 20:26


 


POC Glucose  291 H  263 H  253 H














  01/11/21





  17:02


 


POC Glucose  238 H














Hospitalist ROS





- Review of Systems


Other: 





Unable to obtain





- Medication


Medications: 


Active Medications











Generic Name Dose Route Start Last Admin





  Trade Name Wander  PRN Reason Stop Dose Admin


 


Acetaminophen  650 mg  01/09/21 16:02  01/10/21 02:12





  Acetaminophen 325 Mg Tab  PO   650 mg





  Q4H PRN   Administration





  Fever/Mild Pain  


 


Acetaminophen  325 mg  01/10/21 10:49  01/12/21 00:58





  Acetaminophen 325 Mg Suppository  SD   325 mg





  Q4H PRN   Administration





  Headache/Fever or Pain  


 


Carvedilol  6.25 mg  01/07/21 17:00  01/12/21 07:40





  Carvedilol 6.25 Mg Tab  PO   Not Given





  BID- JULIO  


 


Dexamethasone  6 mg  01/10/21 09:00  01/12/21 07:47





  Dexamethasone 4 Mg/Ml Vial  SLOW IVP   6 mg





  DAILY JULIO   Administration


 


Heparin Sodium (Porcine)  5,000 units  01/10/21 15:00  01/12/21 14:16





  Heparin 5,000 Units/Ml Vial  SC   5,000 units





  TID JULIO   Administration


 


Dextrose/Water  1,000 mls @ 75 mls/hr  01/09/21 10:00  01/12/21 14:15





  D5w  IV   1,000 mls





  .[id number] JULIO   Administration


 


Diltiazem HCl 125 mg/ Sodium  125 mls @ 7.5 mls/hr  01/11/21 09:00  01/12/21 

09:37





  Chloride  IVPB   125 mls





  INF JULIO   Administration





  Protocol  





  7.5 MG/HR  


 


Insulin Human Lispro  0 units  01/10/21 14:35  01/12/21 11:30





  Humalog 300 Units/3 Ml Vial  SC   4 units





  .MILD SLIDING SCALE PRN   Administration





  Mild Correctional Scale  


 


Insulin Human Lispro  0 units  01/11/21 21:21  01/12/21 00:24





  Humalog 300 Units/3 Ml Vial  SC   3 unit





  .BEDTIME SLIDING SC PRN   Administration





  Bedtime Correctional Scale  


 


Metoprolol Tartrate  5 mg  01/11/21 01:57  01/11/21 03:09





  Metoprolol Tartrate 5 Mg/5 Ml Vial  IVP   5 mg





  Q6H PRN   Administration





  To Control Heart Rate  


 


Sodium Chloride  10 ml  01/09/21 21:00  01/12/21 07:48





  Flush - Normal Saline 10 Ml Syringe  IVF   10 ml





  Q12HR JULIO   Administration














- Exam


Heart: negative: RRR, no murmur, no gallops, no rubs, normal peripheral pulses, 

irregular, diminshed peripheral pulses, murmur present, II/IV, III/IV


Respiratory: negative: CTAB, no wheezes, no rales, no ronchi, normal chest 

expansion, no tachypnea, normal percussion, rales, rhonchi, tachypneic, wheezes


Gastrointestinal: negative: soft, non-tender, non-distended, normal bowel sound

s, no palpable masses, no hepatomegaly, no splenomegaly, no bruit, no guarding, 

no rigidity, tender to palpation, distended, diminished bowl sounds, voluntary 

guarding


Extremities: 1+ LE edema





Hosp A/P


(1) COVID-19


Code(s): U07.1 - COVID-19   Status: Acute   





(2) Acute respiratory failure with hypoxia


Code(s): J96.01 - ACUTE RESPIRATORY FAILURE WITH HYPOXIA   Status: Acute   





(3) Atrial fibrillation with rapid ventricular response


Code(s): I48.91 - UNSPECIFIED ATRIAL FIBRILLATION   Status: Acute   





(4) Dementia


Code(s): F03.90 - UNSPECIFIED DEMENTIA WITHOUT BEHAVIORAL DISTURBANCE   Status: 

Acute   





(5) Dysphagia


Code(s): R13.10 - DYSPHAGIA, UNSPECIFIED   Status: Acute   





(6) Hypertension


Code(s): I10 - ESSENTIAL (PRIMARY) HYPERTENSION   Status: Acute   





(7) Hypernatremia


Code(s): E87.0 - HYPEROSMOLALITY AND HYPERNATREMIA   Status: Acute   





(8) Protein calorie malnutrition


Code(s): E46 - UNSPECIFIED PROTEIN-CALORIE MALNUTRITION   Status: Acute   





- Plan





Patient currently on D5 water.  We will also start patient on PPN.  Family 

called both patient's daughters called updated.  Patient's wife also called forde

courtney she did not  the phone so left a message.  Explained in details about

patient's overall poor prognosis and it seems that patient might be headed for 

intubation which would be futile.  Patient is severely deconditioned has not 

been eating.  We will also consult palliative care.  Attempts were made to call 

patient's wife.  We will start patient on a Cardizem drip.





1/12 patient continues to be altered.  Is not responding to verbal command.  

Continue PPN.  I did speak with the patient's wife and daughter updated about 

overall poor prognosis.  Recommended hospice.  Patient's wife and daughter will 

come and visit him today.  Patient sodium level improved.  Creatinine has 

worsened.  Patient's Cardizem drip was titrated up.

## 2021-01-13 LAB
ALBUMIN SERPL BCG-MCNC: 3 G/DL (ref 3.4–4.8)
ALP SERPL-CCNC: 79 U/L (ref 40–110)
ALT SERPL W P-5'-P-CCNC: 73 U/L (ref 8–55)
ANION GAP SERPL CALC-SCNC: 14 MMOL/L (ref 10–20)
AST SERPL-CCNC: 56 U/L (ref 5–34)
BILIRUB SERPL-MCNC: 1.2 MG/DL (ref 0.2–1.2)
BUN SERPL-MCNC: 83 MG/DL (ref 8.4–25.7)
CALCIUM SERPL-MCNC: 8.7 MG/DL (ref 7.8–10.44)
CHLORIDE SERPL-SCNC: 116 MMOL/L (ref 98–107)
CO2 SERPL-SCNC: 24 MMOL/L (ref 23–31)
CREAT CL PREDICTED SERPL C-G-VRATE: 30 ML/MIN (ref 70–130)
CRP SERPL-MCNC: 3.46 MG/DL
GLOBULIN SER CALC-MCNC: 3.5 G/DL (ref 2.4–3.5)
GLUCOSE SERPL-MCNC: 324 MG/DL (ref 83–110)
HGB BLD-MCNC: 15.1 G/DL (ref 14–18)
MCH RBC QN AUTO: 32.1 PG (ref 27–31)
MCV RBC AUTO: 97.3 FL (ref 78–98)
MDIFF COMPLETE?: YES
PLATELET # BLD AUTO: 139 THOU/UL (ref 130–400)
POTASSIUM SERPL-SCNC: 4.4 MMOL/L (ref 3.5–5.1)
RBC # BLD AUTO: 4.71 MILL/UL (ref 4.7–6.1)
SODIUM SERPL-SCNC: 150 MMOL/L (ref 136–145)
WBC # BLD AUTO: 18 THOU/UL (ref 4.8–10.8)

## 2021-01-13 RX ADMIN — METRONIDAZOLE SCH MLS: 500 INJECTION, SOLUTION INTRAVENOUS at 19:56

## 2021-01-13 RX ADMIN — LEVETIRACETAM SCH MLS: 5 INJECTION INTRAVENOUS at 21:15

## 2021-01-13 RX ADMIN — METRONIDAZOLE SCH MLS: 500 INJECTION, SOLUTION INTRAVENOUS at 12:45

## 2021-01-13 RX ADMIN — Medication SCH ML: at 21:16

## 2021-01-13 RX ADMIN — HEPARIN SODIUM SCH UNITS: 5000 INJECTION, SOLUTION INTRAVENOUS; SUBCUTANEOUS at 21:15

## 2021-01-13 RX ADMIN — HEPARIN SODIUM SCH UNITS: 5000 INJECTION, SOLUTION INTRAVENOUS; SUBCUTANEOUS at 09:32

## 2021-01-13 RX ADMIN — INSULIN LISPRO PRN UNIT: 100 INJECTION, SOLUTION INTRAVENOUS; SUBCUTANEOUS at 18:14

## 2021-01-13 RX ADMIN — INSULIN LISPRO PRN UNIT: 100 INJECTION, SOLUTION INTRAVENOUS; SUBCUTANEOUS at 12:52

## 2021-01-13 RX ADMIN — Medication SCH: at 09:33

## 2021-01-13 RX ADMIN — VANCOMYCIN HYDROCHLORIDE SCH MLS: 750 INJECTION, POWDER, LYOPHILIZED, FOR SOLUTION INTRAVENOUS at 15:50

## 2021-01-13 RX ADMIN — INSULIN LISPRO PRN UNITS: 100 INJECTION, SOLUTION INTRAVENOUS; SUBCUTANEOUS at 05:58

## 2021-01-13 RX ADMIN — HEPARIN SODIUM SCH UNITS: 5000 INJECTION, SOLUTION INTRAVENOUS; SUBCUTANEOUS at 15:04

## 2021-01-13 RX ADMIN — MEROPENEM AND SODIUM CHLORIDE SCH MLS: 1 INJECTION, SOLUTION INTRAVENOUS at 23:02

## 2021-01-13 RX ADMIN — MEROPENEM AND SODIUM CHLORIDE SCH MLS: 1 INJECTION, SOLUTION INTRAVENOUS at 14:56

## 2021-01-13 RX ADMIN — INSULIN LISPRO PRN UNIT: 100 INJECTION, SOLUTION INTRAVENOUS; SUBCUTANEOUS at 21:16

## 2021-01-13 NOTE — PDOC.HOSPP
- Subjective


Encounter Date: 01/13/21


Encounter Time: 11:00


Subjective: 


Patient up in bed obtunded does not respond.





- Objective


Vital Signs & Weight: 


                             Vital Signs (12 hours)











  Temp Pulse Resp BP Pulse Ox


 


 01/13/21 11:10  97.8 F  103 H  38 H  129/99 H  100


 


 01/13/21 07:10  98.9 F  107 H  42 H  163/84 H  100


 


 01/13/21 04:08  98.2 F  45 L  14  130/63  96








                                     Weight











Admit Weight                   174 lb


 


Weight                         171 lb 3.2 oz














I&O: 


                                        











 01/12/21 01/13/21 01/14/21





 06:59 06:59 06:59


 


Intake Total 1896 757 


 


Balance 1896 757 











Result Diagrams: 


                                 01/14/21 05:17





                                 01/14/21 05:17


Additional Labs: 


                                   Accuchecks











  01/13/21 01/12/21 01/12/21





  04:52 20:22 15:36


 


POC Glucose  312 H  322 H  310 H














Hospitalist ROS





- Review of Systems


Other: 





Unable to obtain.





- Medication


Medications: 


Active Medications











Generic Name Dose Route Start Last Admin





  Trade Name Freq  PRN Reason Stop Dose Admin


 


Acetaminophen  650 mg  01/09/21 16:02  01/10/21 02:12





  Acetaminophen 325 Mg Tab  PO   650 mg





  Q4H PRN   Administration





  Fever/Mild Pain  


 


Acetaminophen  325 mg  01/10/21 10:49  01/12/21 16:17





  Acetaminophen 325 Mg Suppository  VT   325 mg





  Q4H PRN   Administration





  Headache/Fever or Pain  


 


Carvedilol  6.25 mg  01/07/21 17:00  01/13/21 12:45





  Carvedilol 6.25 Mg Tab  PO   Not Given





  BID- JULIO  


 


Dexamethasone  6 mg  01/10/21 09:00  01/13/21 09:31





  Dexamethasone 4 Mg/Ml Vial  SLOW IVP   6 mg





  DAILY JULIO   Administration


 


Heparin Sodium (Porcine)  5,000 units  01/10/21 15:00  01/13/21 09:32





  Heparin 5,000 Units/Ml Vial  SC   5,000 units





  TID JULIO   Administration


 


Diltiazem HCl 125 mg/ Sodium  125 mls @ 7.5 mls/hr  01/11/21 09:00  01/12/21 

09:37





  Chloride  IVPB   125 mls





  INF JULIO   Administration





  Protocol  





  7.5 MG/HR  


 


Amino Ac/Electrol/Dextrose/Calcium  2,000 mls @ 50 mls/hr  01/12/21 08:30  

01/12/21 21:30





  Clinimix E 4.25%-5% Solution  IV   2,000 mls





  INF JULIO   Administration


 


Metronidazole 500 mg/ Device  100 mls @ 100 mls/hr  01/13/21 11:00  01/13/21 

12:45





  IVPB   100 mls





  0300,1100,1900 JULIO   Administration


 


Insulin Human Lispro  0 units  01/11/21 21:21  01/12/21 21:29





  Humalog 300 Units/3 Ml Vial  SC   4 unit





  .BEDTIME SLIDING SC PRN   Administration





  Bedtime Correctional Scale  


 


Insulin Human Lispro  0 units  01/13/21 10:00  01/13/21 12:52





  Humalog 300 Units/3 Ml Vial  SC   8 unit





  .MODERATE SLIDING SC PRN   Administration





  Moderate Correctional Scale  


 


Metoprolol Tartrate  5 mg  01/11/21 01:57  01/11/21 03:09





  Metoprolol Tartrate 5 Mg/5 Ml Vial  IVP   5 mg





  Q6H PRN   Administration





  To Control Heart Rate  


 


Sodium Chloride  10 ml  01/09/21 21:00  01/13/21 09:33





  Flush - Normal Saline 10 Ml Syringe  IVF   Not Given





  Q12HR JULIO  














- Exam


Neck: negative: supple, symmetric, no JVD, no thyromegaly, no lymphadenopathy, 

no carotid bruit, JVD


Heart: negative: RRR, no murmur, no gallops, no rubs, normal peripheral pulses, 

irregular, diminshed peripheral pulses, murmur present, II/IV, III/IV


Respiratory: negative: CTAB, no wheezes, no rales, no ronchi, normal chest 

expansion, no tachypnea, normal percussion, rales, rhonchi, tachypneic, wheezes


Gastrointestinal: negative: soft, non-tender, non-distended, normal bowel 

sounds, no palpable masses, no hepatomegaly, no splenomegaly, no bruit, no 

guarding, no rigidity, tender to palpation, distended, diminished bowl sounds, 

voluntary guarding





Hosp A/P


(1) COVID-19


Code(s): U07.1 - COVID-19   Status: Acute   





(2) Acute respiratory failure with hypoxia


Code(s): J96.01 - ACUTE RESPIRATORY FAILURE WITH HYPOXIA   Status: Acute   





(3) Atrial fibrillation with rapid ventricular response


Code(s): I48.91 - UNSPECIFIED ATRIAL FIBRILLATION   Status: Acute   





(4) Dementia


Code(s): F03.90 - UNSPECIFIED DEMENTIA WITHOUT BEHAVIORAL DISTURBANCE   Status: 

Acute   





(5) Dysphagia


Code(s): R13.10 - DYSPHAGIA, UNSPECIFIED   Status: Acute   





(6) Hypertension


Code(s): I10 - ESSENTIAL (PRIMARY) HYPERTENSION   Status: Acute   





(7) Hypernatremia


Code(s): E87.0 - HYPEROSMOLALITY AND HYPERNATREMIA   Status: Acute   





(8) Protein calorie malnutrition


Code(s): E46 - UNSPECIFIED PROTEIN-CALORIE MALNUTRITION   Status: Acute   





- Plan





Patient currently on D5 water.  We will also start patient on PPN.  Family 

called both patient's daughters called updated.  Patient's wife also called 

however she did not  the phone so left a message.  Explained in details 

about patient's overall poor prognosis and it seems that patient might be headed

for intubation which would be futile.  Patient is severely deconditioned has not

been eating.  We will also consult palliative care.  Attempts were made to call 

patient's wife.  We will start patient on a Cardizem drip.





1/12 patient continues to be altered.  Is not responding to verbal command.  

Continue PPN.  I did speak with the patient's wife and daughter updated about 

overall poor prognosis.  Recommended hospice.  Patient's wife and daughter will 

come and visit him today.  Patient sodium level improved.  Creatinine has 

worsened.  Patient's Cardizem drip was titrated up.





1/13 we will continue PPN for now.  We will start patient on broad-spectrum 

antibiotics again for the next 24 hours and see if this helps to change 

patient's overall mental condition.  However I doubt it well.  After speaking 

with the family yesterday did they did come and see the patient.  Patient's 

heart rate is controlled currently.  His overall prognosis is very poor.  We 

will start patient back on D5 water given his elevated sodium.  Spoke with 

patient's daughter Monalisa updated her and her mom.  Family wanted to take patient

home with hospice.

## 2021-01-14 LAB
ANION GAP SERPL CALC-SCNC: 14 MMOL/L (ref 10–20)
BUN SERPL-MCNC: 79 MG/DL (ref 8.4–25.7)
CALCIUM SERPL-MCNC: 8.4 MG/DL (ref 7.8–10.44)
CHLORIDE SERPL-SCNC: 117 MMOL/L (ref 98–107)
CO2 SERPL-SCNC: 21 MMOL/L (ref 23–31)
CREAT CL PREDICTED SERPL C-G-VRATE: 33 ML/MIN (ref 70–130)
GLUCOSE SERPL-MCNC: 310 MG/DL (ref 83–110)
HGB BLD-MCNC: 14.7 G/DL (ref 14–18)
MCH RBC QN AUTO: 31.9 PG (ref 27–31)
MCV RBC AUTO: 97.9 FL (ref 78–98)
MDIFF COMPLETE?: YES
PLATELET # BLD AUTO: 113 THOU/UL (ref 130–400)
POTASSIUM SERPL-SCNC: 4.5 MMOL/L (ref 3.5–5.1)
RBC # BLD AUTO: 4.6 MILL/UL (ref 4.7–6.1)
SODIUM SERPL-SCNC: 147 MMOL/L (ref 136–145)
WBC # BLD AUTO: 18.3 THOU/UL (ref 4.8–10.8)

## 2021-01-14 RX ADMIN — MEROPENEM AND SODIUM CHLORIDE SCH MLS: 1 INJECTION, SOLUTION INTRAVENOUS at 22:11

## 2021-01-14 RX ADMIN — HEPARIN SODIUM SCH UNITS: 5000 INJECTION, SOLUTION INTRAVENOUS; SUBCUTANEOUS at 20:34

## 2021-01-14 RX ADMIN — LEUCINE, PHENYLALANINE, LYSINE, METHIONINE, ISOLEUCINE, VALINE, HISTIDINE, THREONINE, TRYPTOPHAN, ALANINE, GLYCINE, ARGININE, PROLINE, SERINE, TYROSINE, SODIUM ACETATE, DIBASIC POTASSIUM PHOSPHATE, MAGNESIUM CHLORIDE, SODIUM CHLORIDE, CALCIUM CHLORIDE, DEXTROSE SCH MLS
311; 238; 247; 170; 255; 247; 204; 179; 77; 880; 438; 489; 289; 213; 17; 297; 261; 51; 77; 33; 5 INJECTION INTRAVENOUS at 17:29

## 2021-01-14 RX ADMIN — VANCOMYCIN HYDROCHLORIDE SCH MLS: 750 INJECTION, POWDER, LYOPHILIZED, FOR SOLUTION INTRAVENOUS at 13:49

## 2021-01-14 RX ADMIN — INSULIN LISPRO PRN UNIT: 100 INJECTION, SOLUTION INTRAVENOUS; SUBCUTANEOUS at 14:32

## 2021-01-14 RX ADMIN — INSULIN LISPRO PRN UNIT: 100 INJECTION, SOLUTION INTRAVENOUS; SUBCUTANEOUS at 19:12

## 2021-01-14 RX ADMIN — INSULIN LISPRO PRN UNIT: 100 INJECTION, SOLUTION INTRAVENOUS; SUBCUTANEOUS at 20:35

## 2021-01-14 RX ADMIN — Medication SCH ML: at 20:35

## 2021-01-14 RX ADMIN — HEPARIN SODIUM SCH UNITS: 5000 INJECTION, SOLUTION INTRAVENOUS; SUBCUTANEOUS at 08:07

## 2021-01-14 RX ADMIN — LEVETIRACETAM SCH MLS: 5 INJECTION INTRAVENOUS at 20:34

## 2021-01-14 RX ADMIN — HEPARIN SODIUM SCH UNITS: 5000 INJECTION, SOLUTION INTRAVENOUS; SUBCUTANEOUS at 14:38

## 2021-01-14 RX ADMIN — Medication SCH ML: at 08:11

## 2021-01-14 RX ADMIN — MEROPENEM AND SODIUM CHLORIDE SCH MLS: 1 INJECTION, SOLUTION INTRAVENOUS at 12:53

## 2021-01-14 RX ADMIN — METRONIDAZOLE SCH MLS: 500 INJECTION, SOLUTION INTRAVENOUS at 19:13

## 2021-01-14 RX ADMIN — METRONIDAZOLE SCH MLS: 500 INJECTION, SOLUTION INTRAVENOUS at 03:46

## 2021-01-14 RX ADMIN — LEVETIRACETAM SCH MLS: 5 INJECTION INTRAVENOUS at 08:06

## 2021-01-14 RX ADMIN — INSULIN LISPRO PRN UNIT: 100 INJECTION, SOLUTION INTRAVENOUS; SUBCUTANEOUS at 06:03

## 2021-01-14 RX ADMIN — METRONIDAZOLE SCH MLS: 500 INJECTION, SOLUTION INTRAVENOUS at 11:32

## 2021-01-14 NOTE — PDOC.HOSPP
- Subjective


Encounter Date: 01/14/21


Encounter Time: 10:30


Subjective: 


Patient continues to be obtunded





- Objective


Vital Signs & Weight: 


                             Vital Signs (12 hours)











  Temp Pulse Resp BP BP Pulse Ox


 


 01/14/21 11:25  98.9 F  101 H   128/67   100


 


 01/14/21 08:00  99.1 F  100  30 H   145/74 H  100








                                     Weight











Admit Weight                   174 lb


 


Weight                         171 lb 3.2 oz














I&O: 


                                        











 01/13/21 01/14/21 01/15/21





 06:59 06:59 06:59


 


Intake Total 757 2182 


 


Balance 757 2182 











Result Diagrams: 


                                 01/14/21 05:17





                                 01/14/21 05:17


Additional Labs: 


                                   Accuchecks











  01/14/21 01/14/21 01/13/21





  10:38 05:29 20:24


 


POC Glucose  282 H  293 H  309 H














  01/13/21 01/13/21





  16:37 11:00


 


POC Glucose  268 H  311 H














Hospitalist ROS





- Review of Systems


Other: 





Unable to obtain





- Medication


Medications: 


Active Medications











Generic Name Dose Route Start Last Admin





  Trade Name Freq  PRN Reason Stop Dose Admin


 


Acetaminophen  650 mg  01/09/21 16:02  01/10/21 02:12





  Acetaminophen 325 Mg Tab  PO   650 mg





  Q4H PRN   Administration





  Fever/Mild Pain  


 


Acetaminophen  325 mg  01/10/21 10:49  01/12/21 16:17





  Acetaminophen 325 Mg Suppository  N[place]   325 mg





  Q4H PRN   Administration





  Headache/Fever or Pain  


 


Carvedilol  6.25 mg  01/07/21 17:00  01/14/21 08:13





  Carvedilol 6.25 Mg Tab  PO   Not Given





  BID- JULIO  


 


Dexamethasone  6 mg  01/10/21 09:00  01/14/21 08:08





  Dexamethasone 4 Mg/Ml Vial  SLOW IVP   6 mg





  DAILY JULIO   Administration


 


Heparin Sodium (Porcine)  5,000 units  01/10/21 15:00  01/14/21 14:38





  Heparin 5,000 Units/Ml Vial  SC   5,000 units





  TID JULIO   Administration


 


Diltiazem HCl 125 mg/ Sodium  125 mls @ 7.5 mls/hr  01/11/21 09:00  01/12/21 

09:37





  Chloride  IVPB   125 mls





  INF JULIO   Administration





  Protocol  





  7.5 MG/HR  


 


Amino Ac/Electrol/Dextrose/Calcium  2,000 mls @ 50 mls/hr  01/12/21 08:30  

01/12/21 21:30





  Clinimix E 4.25%-5% Solution  IV   2,000 mls





  INF JULIO   Administration


 


Meropenem 1 gm/ Device  50 mls @ 100 mls/hr  01/13/21 12:00  01/14/21 12:53





  IVPB   50 mls





  1200,2359 JULIO   Administration


 


Metronidazole 500 mg/ Device  100 mls @ 100 mls/hr  01/13/21 11:00  01/14/21 

11:32





  IVPB   100 mls





  0300,1100,1900 JULIO   Administration


 


Vancomycin HCl 750 mg/ Sodium  250 mls @ 250 mls/hr  01/13/21 12:00  01/14/21 

13:49





  Chloride  IVPB   250 mls





  1200 JULIO   Administration


 


Dextrose/Water  1,000 mls @ 50 mls/hr  01/13/21 14:45  01/14/21 11:34





  D5w  IV   1,000 mls





  .Q20H JULIO   Administration


 


Levetiracetam 500 mg/ Device  100 mls @ 200 mls/hr  01/13/21 21:00  01/14/21 

08:06





  IVPB   100 mls





  BID JULIO   Administration


 


Insulin Human Lispro  0 units  01/11/21 21:21  01/13/21 21:16





  Humalog 300 Units/3 Ml Vial  SC   4 unit





  .BEDTIME SLIDING SC PRN   Administration





  Bedtime Correctional Scale  


 


Insulin Human Lispro  0 units  01/13/21 10:00  01/14/21 14:32





  Humalog 300 Units/3 Ml Vial  SC   6 unit





  .MODERATE SLIDING SC PRN   Administration





  Moderate Correctional Scale  


 


Metoprolol Tartrate  5 mg  01/11/21 01:57  01/11/21 03:09





  Metoprolol Tartrate 5 Mg/5 Ml Vial  IVP   5 mg





  Q6H PRN   Administration





  To Control Heart Rate  


 


Sodium Chloride  10 ml  01/09/21 21:00  01/14/21 08:11





  Flush - Normal Saline 10 Ml Syringe  IVF   10 ml





  Q12HR JULIO   Administration














- Exam


Neck: negative: supple, symmetric, no JVD, no thyromegaly, no lymphadenopathy, 

no carotid bruit, JVD


Heart: negative: RRR, no murmur, no gallops, no rubs, normal peripheral pulses, 

irregular, diminshed peripheral pulses, murmur present, II/IV, III/IV


Respiratory: rhonchi


Gastrointestinal: soft, normal bowel sounds


Extremities: 1+ LE edema





Hosp A/P


(1) COVID-19


Code(s): U07.1 - COVID-19   Status: Acute   





(2) Acute respiratory failure with hypoxia


Code(s): J96.01 - ACUTE RESPIRATORY FAILURE WITH HYPOXIA   Status: Acute   





(3) Atrial fibrillation with rapid ventricular response


Code(s): I48.91 - UNSPECIFIED ATRIAL FIBRILLATION   Status: Acute   





(4) Dementia


Code(s): F03.90 - UNSPECIFIED DEMENTIA WITHOUT BEHAVIORAL DISTURBANCE   Status: 

Acute   





(5) Dysphagia


Code(s): R13.10 - DYSPHAGIA, UNSPECIFIED   Status: Acute   





(6) Hypertension


Code(s): I10 - ESSENTIAL (PRIMARY) HYPERTENSION   Status: Acute   





(7) Hypernatremia


Code(s): E87.0 - HYPEROSMOLALITY AND HYPERNATREMIA   Status: Acute   





(8) Protein calorie malnutrition


Code(s): E46 - UNSPECIFIED PROTEIN-CALORIE MALNUTRITION   Status: Acute   





- Plan





Patient currently on D5 water.  We will also start patient on PPN.  Family 

called both patient's daughters called updated.  Patient's wife also called 

however she did not  the phone so left a message.  Explained in details 

about patient's overall poor prognosis and it seems that patient might be headed

for intubation which would be futile.  Patient is severely deconditioned has not

been eating.  We will also consult palliative care.  Attempts were made to call 

patient's wife.  We will start patient on a Cardizem drip.





1/12 patient continues to be altered.  Is not responding to verbal command.  

Continue PPN.  I did speak with the patient's wife and daughter updated about 

overall poor prognosis.  Recommended hospice.  Patient's wife and daughter will 

come and visit him today.  Patient sodium level improved.  Creatinine has 

worsened.  Patient's Cardizem drip was titrated up.





1/13 we will continue PPN for now.  We will start patient on broad-spectrum 

antibiotics again for the next 24 hours and see if this helps to change 

patient's overall mental condition.  However I doubt it well.  After speaking 

with the family yesterday did they did come and see the patient.  Patient's 

heart rate is controlled currently.  His overall prognosis is very poor.  We 

will start patient back on D5 water given his elevated sodium.  Spoke with 

patient's daughter Monalisa updated her and her mom.  Family wanted to take patient

home with hospice.





1/14 spoke with patient's family yesterday appears that patient was initially 

taken to [facility] [place] for change in mental status.  At this time he 

was worked up for stroke and also for seizure.  According to the patient's 

daughter Monalisa MRI brain was negative for stroke and seizure was not indicated 

on EEG.  At this time patient was discharged home. Patient's mentation continued

to worsen per patient's daughter at this time he was brought here to the 

hospital.  At baseline patient's family has caregivers in the daytime hours.  I 

have continued the antibiotics no significant change in patient's condition.  I 

have also started patient on Keppra yesterday no changes in patient's condition.

 I have consulted case management for hospice.

## 2021-01-15 LAB — VANCOMYCIN TROUGH SERPL-MCNC: 12.2 UG/ML

## 2021-01-15 RX ADMIN — METRONIDAZOLE SCH MLS: 500 INJECTION, SOLUTION INTRAVENOUS at 09:51

## 2021-01-15 RX ADMIN — HEPARIN SODIUM SCH UNITS: 5000 INJECTION, SOLUTION INTRAVENOUS; SUBCUTANEOUS at 09:50

## 2021-01-15 RX ADMIN — HEPARIN SODIUM SCH UNITS: 5000 INJECTION, SOLUTION INTRAVENOUS; SUBCUTANEOUS at 14:25

## 2021-01-15 RX ADMIN — METRONIDAZOLE SCH MLS: 500 INJECTION, SOLUTION INTRAVENOUS at 03:16

## 2021-01-15 RX ADMIN — METRONIDAZOLE SCH MLS: 500 INJECTION, SOLUTION INTRAVENOUS at 18:32

## 2021-01-15 RX ADMIN — Medication SCH ML: at 09:51

## 2021-01-15 RX ADMIN — INSULIN LISPRO PRN UNIT: 100 INJECTION, SOLUTION INTRAVENOUS; SUBCUTANEOUS at 18:29

## 2021-01-15 RX ADMIN — HEPARIN SODIUM SCH UNITS: 5000 INJECTION, SOLUTION INTRAVENOUS; SUBCUTANEOUS at 20:21

## 2021-01-15 RX ADMIN — LEVETIRACETAM SCH MLS: 5 INJECTION INTRAVENOUS at 20:21

## 2021-01-15 RX ADMIN — INSULIN LISPRO PRN UNIT: 100 INJECTION, SOLUTION INTRAVENOUS; SUBCUTANEOUS at 10:55

## 2021-01-15 RX ADMIN — MEROPENEM AND SODIUM CHLORIDE SCH MLS: 1 INJECTION, SOLUTION INTRAVENOUS at 12:54

## 2021-01-15 RX ADMIN — INSULIN LISPRO PRN UNIT: 100 INJECTION, SOLUTION INTRAVENOUS; SUBCUTANEOUS at 06:04

## 2021-01-15 RX ADMIN — LEVETIRACETAM SCH MLS: 5 INJECTION INTRAVENOUS at 09:51

## 2021-01-15 RX ADMIN — VANCOMYCIN HYDROCHLORIDE SCH MLS: 750 INJECTION, POWDER, LYOPHILIZED, FOR SOLUTION INTRAVENOUS at 14:22

## 2021-01-15 RX ADMIN — INSULIN LISPRO PRN UNIT: 100 INJECTION, SOLUTION INTRAVENOUS; SUBCUTANEOUS at 20:23

## 2021-01-15 RX ADMIN — Medication SCH ML: at 20:21

## 2021-01-15 NOTE — PDOC.HOSPP
- Subjective


Encounter Date: 01/15/21


Encounter Time: 10:30


Subjective: 


Patient appears nonverbal.





- Objective


Vital Signs & Weight: 


                             Vital Signs (12 hours)











  Temp Pulse Resp BP BP Pulse Ox


 


 01/15/21 19:55  99.1 F  108 H  30 H  119/81   97


 


 01/15/21 16:20  99.2 F  111 H  28 H   157/99 H  99


 


 01/15/21 10:34  98.8 F  105 H  28 H   141/95 H  100








                                     Weight











Admit Weight                   174 lb


 


Weight                         171 lb 3.2 oz














I&O: 


                                        











 01/14/21 01/15/21 01/16/21





 06:59 06:59 06:59


 


Intake Total 2182 1973.1 2093.8


 


Balance 2182 1973.1 2093.8











Result Diagrams: 


                                 01/14/21 05:17





                                 01/14/21 05:17


Additional Labs: 


                                   Accuchecks











  01/15/21 01/15/21 01/15/21





  20:20 10:32 05:36


 


POC Glucose  262 H  243 H  245 H














Hospitalist ROS





- Review of Systems


Other: 





Patient nonverbal





- Medication


Medications: 


Active Medications











Generic Name Dose Route Start Last Admin





  Trade Name Freq  PRN Reason Stop Dose Admin


 


Acetaminophen  650 mg  01/09/21 16:02  01/10/21 02:12





  Acetaminophen 325 Mg Tab  PO   650 mg





  Q4H PRN   Administration





  Fever/Mild Pain  


 


Acetaminophen  325 mg  01/10/21 10:49  01/12/21 16:17





  Acetaminophen 325 Mg Suppository  [place]   325 mg





  Q4H PRN   Administration





  Headache/Fever or Pain  


 


Carvedilol  6.25 mg  01/07/21 17:00  01/15/21 15:40





  Carvedilol 6.25 Mg Tab  PO   Not Given





  BID- JULIO  


 


Dexamethasone  6 mg  01/10/21 09:00  01/15/21 09:51





  Dexamethasone 4 Mg/Ml Vial  SLOW IVP   6 mg





  DAILY JULIO   Administration


 


Heparin Sodium (Porcine)  5,000 units  01/10/21 15:00  01/15/21 20:21





  Heparin 5,000 Units/Ml Vial  SC   5,000 units





  TID JULIO   Administration


 


Diltiazem HCl 125 mg/ Sodium  125 mls @ 7.5 mls/hr  01/11/21 09:00  01/15/21 

12:56





  Chloride  IVPB   125 mls





  INF JULIO   Administration





  Protocol  





  7.5 MG/HR  


 


Amino Ac/Electrol/Dextrose/Calcium  2,000 mls @ 50 mls/hr  01/12/21 08:30  

01/14/21 17:29





  Clinimix E 4.25%-5% Solution  IV   2,000 mls





  INF JULIO   Administration


 


Meropenem 1 gm/ Device  50 mls @ 100 mls/hr  01/13/21 12:00  01/15/21 12:54





  IVPB   50 mls





  1200,2359 JULIO   Administration


 


Metronidazole 500 mg/ Device  100 mls @ 100 mls/hr  01/13/21 11:00  01/15/21 

18:32





  IVPB   100 mls





  0300,1100,1900 JULIO   Administration


 


Vancomycin HCl 750 mg/ Sodium  250 mls @ 250 mls/hr  01/13/21 12:00  01/15/21 

14:22





  Chloride  IVPB   250 mls





  1200 JULIO   Administration


 


Dextrose/Water  1,000 mls @ 50 mls/hr  01/13/21 14:45  01/15/21 18:41





  D5w  IV   1,000 mls





  .Q20H JULIO   Administration


 


Levetiracetam 500 mg/ Device  100 mls @ 200 mls/hr  01/13/21 21:00  01/15/21 20:

21





  IVPB   100 mls





  BID JULIO   Administration


 


Insulin Human Lispro  0 units  01/11/21 21:21  01/15/21 20:23





  Humalog 300 Units/3 Ml Vial  SC   3 unit





  .BEDTIME SLIDING SC PRN   Administration





  Bedtime Correctional Scale  


 


Insulin Human Lispro  0 units  01/13/21 10:00  01/15/21 18:29





  Humalog 300 Units/3 Ml Vial  SC   6 unit





  .MODERATE SLIDING SC PRN   Administration





  Moderate Correctional Scale  


 


Metoprolol Tartrate  5 mg  01/11/21 01:57  01/11/21 03:09





  Metoprolol Tartrate 5 Mg/5 Ml Vial  IVP   5 mg





  Q6H PRN   Administration





  To Control Heart Rate  


 


Sodium Chloride  10 ml  01/09/21 21:00  01/15/21 20:21





  Flush - Normal Saline 10 Ml Syringe  IVF   10 ml





  Q12HR JULIO   Administration














- Exam


Neck: negative: supple, symmetric, no JVD, no thyromegaly, no lymphadenopathy, 

no carotid bruit, JVD


Heart: negative: RRR, no murmur, no gallops, no rubs, normal peripheral pulses, 

irregular, diminshed peripheral pulses, murmur present, II/IV, III/IV


Respiratory: negative: CTAB, no wheezes, no rales, no ronchi, normal chest 

expansion, no tachypnea, normal percussion, rales, rhonchi, tachypneic, wheezes


Gastrointestinal: negative: soft, non-tender, non-distended, normal bowel 

sounds, no palpable masses, no hepatomegaly, no splenomegaly, no bruit, no 

guarding, no rigidity, tender to palpation, distended, diminished bowl sounds, 

voluntary guarding





Hosp A/P


(1) COVID-19


Code(s): U07.1 - COVID-19   Status: Acute   





(2) Acute respiratory failure with hypoxia


Code(s): J96.01 - ACUTE RESPIRATORY FAILURE WITH HYPOXIA   Status: Acute   





(3) Atrial fibrillation with rapid ventricular response


Code(s): I48.91 - UNSPECIFIED ATRIAL FIBRILLATION   Status: Acute   





(4) Dementia


Code(s): F03.90 - UNSPECIFIED DEMENTIA WITHOUT BEHAVIORAL DISTURBANCE   Status: 

Acute   





(5) Dysphagia


Code(s): R13.10 - DYSPHAGIA, UNSPECIFIED   Status: Acute   





(6) Hypertension


Code(s): I10 - ESSENTIAL (PRIMARY) HYPERTENSION   Status: Acute   





(7) Hypernatremia


Code(s): E87.0 - HYPEROSMOLALITY AND HYPERNATREMIA   Status: Acute   





(8) Protein calorie malnutrition


Code(s): E46 - UNSPECIFIED PROTEIN-CALORIE MALNUTRITION   Status: Acute   





- Plan





Patient currently on D5 water.  We will also start patient on PPN.  Family 

called both patient's daughters called updated.  Patient's wife also called 

however she did not  the phone so left a message.  Explained in details 

about patient's overall poor prognosis and it seems that patient might be headed

for intubation which would be futile.  Patient is severely deconditioned has not

been eating.  We will also consult palliative care.  Attempts were made to call 

patient's wife.  We will start patient on a Cardizem drip.





1/12 patient continues to be altered.  Is not responding to verbal command.  

Continue PPN.  I did speak with the patient's wife and daughter updated about 

overall poor prognosis.  Recommended hospice.  Patient's wife and daughter will 

come and visit him today.  Patient sodium level improved.  Creatinine has 

worsened.  Patient's Cardizem drip was titrated up.





1/13 we will continue PPN for now.  We will start patient on broad-spectrum ant

ibiotics again for the next 24 hours and see if this helps to change patient's 

overall mental condition.  However I doubt it well.  After speaking with the 

family yesterday did they did come and see the patient.  Patient's heart rate is

controlled currently.  His overall prognosis is very poor.  We will start 

patient back on D5 water given his elevated sodium.  Spoke with patient's 

daughter Monalisa updated her and her mom.  Family wanted to take patient home with

hospice.





1/14 spoke with patient's daughter Monalisa again patient's overall poor condition.

 Agreed to take patient home with hospice.  I did call both hospice agencies 

about patient going home with peripheral nutrition.  [facility] has agreed

to take patient however will not be able to take the patient onto Monday.  

Monalisa's (daughter) phone number is [phone].

## 2021-01-16 LAB
ANION GAP SERPL CALC-SCNC: 15 MMOL/L (ref 10–20)
ANION GAP SERPL CALC-SCNC: 17 MMOL/L (ref 10–20)
BUN SERPL-MCNC: 71 MG/DL (ref 8.4–25.7)
BUN SERPL-MCNC: 72 MG/DL (ref 8.4–25.7)
CALCIUM SERPL-MCNC: 8 MG/DL (ref 7.8–10.44)
CALCIUM SERPL-MCNC: 8.2 MG/DL (ref 7.8–10.44)
CHLORIDE SERPL-SCNC: 118 MMOL/L (ref 98–107)
CHLORIDE SERPL-SCNC: 119 MMOL/L (ref 98–107)
CO2 SERPL-SCNC: 19 MMOL/L (ref 23–31)
CO2 SERPL-SCNC: 19 MMOL/L (ref 23–31)
CREAT CL PREDICTED SERPL C-G-VRATE: 35 ML/MIN (ref 70–130)
CREAT CL PREDICTED SERPL C-G-VRATE: 38 ML/MIN (ref 70–130)
GLUCOSE SERPL-MCNC: 308 MG/DL (ref 83–110)
GLUCOSE SERPL-MCNC: 345 MG/DL (ref 83–110)
MAGNESIUM SERPL-MCNC: 2.9 MG/DL (ref 1.6–2.6)
POTASSIUM SERPL-SCNC: 5.2 MMOL/L (ref 3.5–5.1)
POTASSIUM SERPL-SCNC: 5.2 MMOL/L (ref 3.5–5.1)
SODIUM SERPL-SCNC: 148 MMOL/L (ref 136–145)
SODIUM SERPL-SCNC: 149 MMOL/L (ref 136–145)

## 2021-01-16 RX ADMIN — METRONIDAZOLE SCH MLS: 500 INJECTION, SOLUTION INTRAVENOUS at 03:11

## 2021-01-16 RX ADMIN — INSULIN GLARGINE SCH MLS: 100 INJECTION, SOLUTION SUBCUTANEOUS at 11:28

## 2021-01-16 RX ADMIN — HEPARIN SODIUM SCH UNITS: 5000 INJECTION, SOLUTION INTRAVENOUS; SUBCUTANEOUS at 13:39

## 2021-01-16 RX ADMIN — HEPARIN SODIUM SCH UNITS: 5000 INJECTION, SOLUTION INTRAVENOUS; SUBCUTANEOUS at 19:51

## 2021-01-16 RX ADMIN — INSULIN LISPRO PRN UNIT: 100 INJECTION, SOLUTION INTRAVENOUS; SUBCUTANEOUS at 06:29

## 2021-01-16 RX ADMIN — LEVETIRACETAM SCH MLS: 5 INJECTION INTRAVENOUS at 07:21

## 2021-01-16 RX ADMIN — Medication SCH ML: at 19:52

## 2021-01-16 RX ADMIN — MEROPENEM AND SODIUM CHLORIDE SCH MLS: 1 INJECTION, SOLUTION INTRAVENOUS at 00:28

## 2021-01-16 RX ADMIN — MEROPENEM AND SODIUM CHLORIDE SCH MLS: 1 INJECTION, SOLUTION INTRAVENOUS at 13:38

## 2021-01-16 RX ADMIN — INSULIN LISPRO PRN UNIT: 100 INJECTION, SOLUTION INTRAVENOUS; SUBCUTANEOUS at 16:11

## 2021-01-16 RX ADMIN — INSULIN LISPRO PRN UNIT: 100 INJECTION, SOLUTION INTRAVENOUS; SUBCUTANEOUS at 20:37

## 2021-01-16 RX ADMIN — METRONIDAZOLE SCH MLS: 500 INJECTION, SOLUTION INTRAVENOUS at 11:32

## 2021-01-16 RX ADMIN — Medication SCH ML: at 07:21

## 2021-01-16 RX ADMIN — VANCOMYCIN HYDROCHLORIDE SCH MLS: 750 INJECTION, POWDER, LYOPHILIZED, FOR SOLUTION INTRAVENOUS at 13:38

## 2021-01-16 RX ADMIN — HEPARIN SODIUM SCH UNITS: 5000 INJECTION, SOLUTION INTRAVENOUS; SUBCUTANEOUS at 07:19

## 2021-01-16 RX ADMIN — INSULIN LISPRO PRN UNIT: 100 INJECTION, SOLUTION INTRAVENOUS; SUBCUTANEOUS at 11:34

## 2021-01-16 RX ADMIN — LEVETIRACETAM SCH MLS: 5 INJECTION INTRAVENOUS at 19:50

## 2021-01-16 NOTE — RAD
XR Chest 1 View Portable



History: Covid pneumonia



Comparison: Radiograph January 9, 2021



Findings: Slight worsening lung aeration with extensive peripheral perihilar opacities. Cardiac devic
e is similar.



No acute osseous abnormality.



Impression: Slight worsening lung aeration.



Reported By: Bang Amador 

Electronically Signed:  1/16/2021 10:10 AM

## 2021-01-16 NOTE — PDOC.HOSPP
- Subjective


Encounter Date: 01/16/21


Encounter Time: 17:48


Subjective: 





F/u: COVID pneumonia








 The patient is on a non-rebreather.  He is non-verbal.  Awaiting hospice bed





BP was 120/100 when I saw him








- Objective


Vital Signs & Weight: 


                             Vital Signs (12 hours)











  Temp Pulse Resp BP BP Pulse Ox


 


 01/16/21 16:10  98.2 F  85  24 H  132/71   100


 


 01/16/21 11:24  97.9 F  85  20   143/85 H  100


 


 01/16/21 09:07       100


 


 01/16/21 07:29  98.7 F  99  26 H  127/73   100








                                     Weight











Admit Weight                   174 lb


 


Weight                         171 lb 3.2 oz














I&O: 


                                        











 01/15/21 01/16/21 01/17/21





 06:59 06:59 06:59


 


Intake Total 1973.1 3719.7 


 


Balance 1973.1 3719.7 











Result Diagrams: 


                                 01/14/21 05:17





                                 01/16/21 16:24


Additional Labs: 


                                   Accuchecks











  01/16/21 01/16/21 01/15/21





  16:07 10:09 20:20


 


POC Glucose  267 H  322 H  262 H














Hospitalist ROS





- Review of Systems


ROS unobtainable: due to mental status





- Medication


Medications: 


Active Medications











Generic Name Dose Route Start Last Admin





  Trade Name Jeremiq  PRN Reason Stop Dose Admin


 


Acetaminophen  650 mg  01/09/21 16:02  01/10/21 02:12





  Acetaminophen 325 Mg Tab  PO   650 mg





  Q4H PRN   Administration





  Fever/Mild Pain  


 


Acetaminophen  325 mg  01/10/21 10:49  01/12/21 16:17





  Acetaminophen 325 Mg Suppository  [place]   325 mg





  Q4H PRN   Administration





  Headache/Fever or Pain  


 


Carvedilol  6.25 mg  01/07/21 17:00  01/16/21 16:40





  Carvedilol 6.25 Mg Tab  PO   Not Given





  BID-WM JULIO  


 


Dexamethasone  6 mg  01/10/21 09:00  01/16/21 07:19





  Dexamethasone 4 Mg/Ml Vial  SLOW IVP   6 mg





  DAILY JULIO   Administration


 


Heparin Sodium (Porcine)  5,000 units  01/10/21 15:00  01/16/21 13:39





  Heparin 5,000 Units/Ml Vial  SC   5,000 units





  TID JULIO   Administration


 


Diltiazem HCl 125 mg/ Sodium  125 mls @ 7.5 mls/hr  01/11/21 09:00  01/16/21 

06:32





  Chloride  IVPB   125 mls





  INF JULIO   Administration





  Protocol  





  7.5 MG/HR  


 


Meropenem 1 gm/ Device  50 mls @ 100 mls/hr  01/13/21 12:00  01/16/21 13:38





  IVPB   50 mls





  1200,2359 JULIO   Administration


 


Metronidazole 500 mg/ Device  100 mls @ 100 mls/hr  01/13/21 11:00  01/16/21 

11:32





  IVPB   100 mls





  0300,1100,1900 JULIO   Administration


 


Vancomycin HCl 750 mg/ Sodium  250 mls @ 250 mls/hr  01/13/21 12:00  01/16/21 

13:38





  Chloride  IVPB   250 mls





  1200 JULIO   Administration


 


Dextrose/Water  1,000 mls @ 50 mls/hr  01/13/21 14:45  01/16/21 14:32





  D5w  IV   1,000 mls





  .Q20H JULIO   Administration


 


Levetiracetam 500 mg/ Device  100 mls @ 200 mls/hr  01/13/21 21:00  01/16/21 

07:21





  IVPB   100 mls





  BID JULIO   Administration


 


Insulin Glargine 7 units/  0.07 mls @ 0 mls/hr  01/16/21 09:00  01/16/21 11:28





  Miscellaneous Medication  SC   0.07 mls





  QAM JULIO   Administration


 


Sodium Acetate 20 meq/ Sodium  1,034.7641 mls @ 50 mls/hr  01/16/21 14:30  

01/16/21 15:59





Chloride 15 meq/ Potassium  IV   1,034.7641 mls





Acetate 10 meq/ Potassium  INF JULIO   Administration





Phosphate 15 mmol/ Calcium   





Gluconate 4.5 meq/ Magnesium   





Sulfate 5 meq/ Amino Acids/   





Dextrose   


 


Insulin Human Lispro  0 units  01/11/21 21:21  01/15/21 20:23





  Humalog 300 Units/3 Ml Vial  SC   3 unit





  .BEDTIME SLIDING SC PRN   Administration





  Bedtime Correctional Scale  


 


Insulin Human Lispro  0 units  01/13/21 10:00  01/16/21 16:11





  Humalog 300 Units/3 Ml Vial  SC   6 unit





  .MODERATE SLIDING SC PRN   Administration





  Moderate Correctional Scale  


 


Metoprolol Tartrate  5 mg  01/11/21 01:57  01/11/21 03:09





  Metoprolol Tartrate 5 Mg/5 Ml Vial  IVP   5 mg





  Q6H PRN   Administration





  To Control Heart Rate  


 


Morphine Sulfate  2 mg  01/15/21 21:16  01/16/21 07:54





  Morphine 2 Mg/Ml Vial  SLOW IVP   2 mg





  Q4H PRN   Administration





  Moderate Pain (4-6)  


 


Sodium Chloride  10 ml  01/09/21 21:00  01/16/21 07:21





  Flush - Normal Saline 10 Ml Syringe  IVF   10 ml





  Q12HR JULIO   Administration














- Exam


General - other findings: on-nonrebreather, nonverbal


Eye: PERRL


ENT: normocephalic atraumatic, no oropharyngeal lesions


Neck: no JVD


Heart: RRR, no murmur, no gallops, no rubs


Respiratory: CTAB, no wheezes, no rales, no ronchi


Gastrointestinal: soft, non-tender, non-distended, normal bowel sounds


Extremities: no cyanosis, no clubbing, no edema





Hosp A/P





- Plan





Chest x ray 1/9: stable changes


Chest  Xray 1/16: worsening lung aeration 





This is an 80 year old male who presented to the hospital with hypoxia and 

altered mental status. He is COVID+








Acute hypoxic respiratory failure secondary to COVID  pneumonia


- continue IV cefepime.  BNP  > 3000. Chest Xray shows interstitial changes. 

Ordered 40 mg IV lasix x 1 on 1/7, however creatinine worsened so further lasix 

held. 


- continue vancomycin, meropenem and dexamethasone.  Will discontinue flagyl . 

His WBC is increasing to 18. Chest Xray shows worsening lung aeration.  Will add

micafungin


- patients respiratory status continues to decline. He will hopefully be 

accepted to hospice in a few days, I am not sure if he will even survive by then

.  Will repeat another BNP 


 





Dementia


- patient non-verbal, refusing to eat.  Patient  to transition to hospice





Hypernatremia


- sodium barely improving to 148. Continue D5W and PPN 





HERMAN - secondary to dehydration 


- creatinine improving to 1.6, continue PPN 








Dysphagia


- patient is NPO on PPN








Elevated troponin


- mild elevation, downtrending. No chest pain, will monitor 











Hypertension  - controlled


- continue coreg





COVID+


- continue dexamethasone

## 2021-01-17 LAB
ANION GAP SERPL CALC-SCNC: 14 MMOL/L (ref 10–20)
ANION GAP SERPL CALC-SCNC: 15 MMOL/L (ref 10–20)
BUN SERPL-MCNC: 67 MG/DL (ref 8.4–25.7)
BUN SERPL-MCNC: 70 MG/DL (ref 8.4–25.7)
CALCIUM SERPL-MCNC: 7.8 MG/DL (ref 7.8–10.44)
CALCIUM SERPL-MCNC: 8 MG/DL (ref 7.8–10.44)
CHLORIDE SERPL-SCNC: 117 MMOL/L (ref 98–107)
CHLORIDE SERPL-SCNC: 118 MMOL/L (ref 98–107)
CO2 SERPL-SCNC: 18 MMOL/L (ref 23–31)
CO2 SERPL-SCNC: 19 MMOL/L (ref 23–31)
CREAT CL PREDICTED SERPL C-G-VRATE: 40 ML/MIN (ref 70–130)
CREAT CL PREDICTED SERPL C-G-VRATE: 42 ML/MIN (ref 70–130)
GLUCOSE SERPL-MCNC: 273 MG/DL (ref 83–110)
GLUCOSE SERPL-MCNC: 304 MG/DL (ref 83–110)
HGB BLD-MCNC: 14.9 G/DL (ref 14–18)
MCH RBC QN AUTO: 32.8 PG (ref 27–31)
MCV RBC AUTO: 98.8 FL (ref 78–98)
PLATELET # BLD AUTO: 72 THOU/UL (ref 130–400)
POTASSIUM SERPL-SCNC: 5.3 MMOL/L (ref 3.5–5.1)
POTASSIUM SERPL-SCNC: 5.4 MMOL/L (ref 3.5–5.1)
RBC # BLD AUTO: 4.56 MILL/UL (ref 4.7–6.1)
SODIUM SERPL-SCNC: 144 MMOL/L (ref 136–145)
SODIUM SERPL-SCNC: 147 MMOL/L (ref 136–145)
WBC # BLD AUTO: 22 THOU/UL (ref 4.8–10.8)

## 2021-01-17 RX ADMIN — Medication SCH ML: at 09:59

## 2021-01-17 RX ADMIN — INSULIN GLARGINE SCH MLS: 100 INJECTION, SOLUTION SUBCUTANEOUS at 09:56

## 2021-01-17 RX ADMIN — MEROPENEM AND SODIUM CHLORIDE SCH MLS: 1 INJECTION, SOLUTION INTRAVENOUS at 00:27

## 2021-01-17 RX ADMIN — LEVETIRACETAM SCH MLS: 5 INJECTION INTRAVENOUS at 21:31

## 2021-01-17 RX ADMIN — INSULIN LISPRO PRN UNIT: 100 INJECTION, SOLUTION INTRAVENOUS; SUBCUTANEOUS at 17:18

## 2021-01-17 RX ADMIN — HEPARIN SODIUM SCH UNITS: 5000 INJECTION, SOLUTION INTRAVENOUS; SUBCUTANEOUS at 17:09

## 2021-01-17 RX ADMIN — Medication SCH ML: at 21:32

## 2021-01-17 RX ADMIN — HEPARIN SODIUM SCH: 5000 INJECTION, SOLUTION INTRAVENOUS; SUBCUTANEOUS at 22:23

## 2021-01-17 RX ADMIN — HEPARIN SODIUM SCH UNITS: 5000 INJECTION, SOLUTION INTRAVENOUS; SUBCUTANEOUS at 09:57

## 2021-01-17 RX ADMIN — LEVETIRACETAM SCH MLS: 5 INJECTION INTRAVENOUS at 09:55

## 2021-01-17 RX ADMIN — MEROPENEM AND SODIUM CHLORIDE SCH MLS: 1 INJECTION, SOLUTION INTRAVENOUS at 11:48

## 2021-01-17 RX ADMIN — INSULIN LISPRO PRN UNIT: 100 INJECTION, SOLUTION INTRAVENOUS; SUBCUTANEOUS at 11:47

## 2021-01-17 RX ADMIN — INSULIN LISPRO PRN UNIT: 100 INJECTION, SOLUTION INTRAVENOUS; SUBCUTANEOUS at 06:27

## 2021-01-17 RX ADMIN — INSULIN LISPRO PRN UNIT: 100 INJECTION, SOLUTION INTRAVENOUS; SUBCUTANEOUS at 21:33

## 2021-01-17 RX ADMIN — VANCOMYCIN HYDROCHLORIDE SCH MLS: 750 INJECTION, POWDER, LYOPHILIZED, FOR SOLUTION INTRAVENOUS at 12:24

## 2021-01-17 NOTE — PDOC.HOSPP
- Subjective


Encounter Date: 01/17/21


Encounter Time: 13:00


Subjective: 








F/u: COVID





THe patient is still on a non-rebreather nonverbal.  No new changes. I discussed

with family member that I would be surprised if patient even survives until 

tomorrow








Per family member, patient had a stroke two months ago and has been talking less

since then but he was able to speak two word sentences 





- Objective


Vital Signs & Weight: 


                             Vital Signs (12 hours)











  Temp Pulse Resp BP Pulse Ox


 


 01/17/21 11:23  99.2 F  94  20  141/98 H  95


 


 01/17/21 09:57      96


 


 01/17/21 08:00  97.5 F L  110 H  24 H  138/95 H  96








                                     Weight











Admit Weight                   174 lb


 


Weight                         176 lb 9 oz














I&O: 


                                        











 01/16/21 01/17/21 01/18/21





 06:59 06:59 06:59


 


Intake Total 3719.7 3131.4 


 


Balance 3719.7 3131.4 











Result Diagrams: 


                                 01/17/21 04:26





                                 01/17/21 04:26


Additional Labs: 


                                   Accuchecks











  01/17/21 01/16/21





  10:56 20:07


 


POC Glucose  260 H  238 H














Hospitalist ROS





- Review of Systems


ROS unobtainable: due to mental status





- Medication


Medications: 


Active Medications











Generic Name Dose Route Start Last Admin





  Trade Name Freq  PRN Reason Stop Dose Admin


 


Acetaminophen  650 mg  01/09/21 16:02  01/10/21 02:12





  Acetaminophen 325 Mg Tab  PO   650 mg





  Q4H PRN   Administration





  Fever/Mild Pain  


 


Acetaminophen  325 mg  01/10/21 10:49  01/12/21 16:17





  Acetaminophen 325 Mg Suppository  NE   325 mg





  Q4H PRN   Administration





  Headache/Fever or Pain  


 


Carvedilol  6.25 mg  01/07/21 17:00  01/17/21 09:59





  Carvedilol 6.25 Mg Tab  PO   Not Given





  BID- JULIO  


 


Heparin Sodium (Porcine)  5,000 units  01/10/21 15:00  01/17/21 09:57





  Heparin 5,000 Units/Ml Vial  SC   5,000 units





  TID JULIO   Administration


 


Diltiazem HCl 125 mg/ Sodium  125 mls @ 7.5 mls/hr  01/11/21 09:00  01/17/21 

05:32





  Chloride  IVPB   125 mls





  INF JULIO   Administration





  Protocol  





  7.5 MG/HR  


 


Meropenem 1 gm/ Device  50 mls @ 100 mls/hr  01/13/21 12:00  01/17/21 11:48





  IVPB   50 mls





  1200,2359 JULIO   Administration


 


Vancomycin HCl 750 mg/ Sodium  250 mls @ 250 mls/hr  01/13/21 12:00  01/17/21 

12:24





  Chloride  IVPB   250 mls





  1200 JULIO   Administration


 


Dextrose/Water  1,000 mls @ 50 mls/hr  01/13/21 14:45  01/17/21 10:09





  D5w  IV   1,000 mls





  .Q20H JULIO   Administration


 


Levetiracetam 500 mg/ Device  100 mls @ 200 mls/hr  01/13/21 21:00  01/17/21 

09:55





  IVPB   100 mls





  BID JULIO   Administration


 


Insulin Glargine 7 units/  0.07 mls @ 0 mls/hr  01/16/21 09:00  01/17/21 09:56





  Miscellaneous Medication  SC   0.07 mls





  QAM JULIO   Administration


 


Micafungin Sodium 100 mg/  100 mls @ 100 mls/hr  01/16/21 18:00  01/16/21 18:23





  Sodium Chloride  IVPB   100 mls





  Q24HR JULIO   Administration


 


Amino Acids/Dextrose  1,000 mls @ 50 mls/hr  01/17/21 10:00  01/17/21 10:06





  Clinimix 4.25/5  IV   1,000 mls





  INF JULIO   Administration


 


Insulin Human Lispro  0 units  01/11/21 21:21  01/16/21 20:37





  Humalog 300 Units/3 Ml Vial  SC   2 unit





  .BEDTIME SLIDING SC PRN   Administration





  Bedtime Correctional Scale  


 


Insulin Human Lispro  0 units  01/13/21 10:00  01/17/21 11:47





  Humalog 300 Units/3 Ml Vial  SC   6 unit





  .MODERATE SLIDING SC PRN   Administration





  Moderate Correctional Scale  


 


Metoprolol Tartrate  5 mg  01/11/21 01:57  01/11/21 03:09





  Metoprolol Tartrate 5 Mg/5 Ml Vial  IVP   5 mg





  Q6H PRN   Administration





  To Control Heart Rate  


 


Morphine Sulfate  2 mg  01/15/21 21:16  01/16/21 07:54





  Morphine 2 Mg/Ml Vial  SLOW IVP   2 mg





  Q4H PRN   Administration





  Moderate Pain (4-6)  


 


Sodium Chloride  10 ml  01/09/21 21:00  01/17/21 09:59





  Flush - Normal Saline 10 Ml Syringe  IVF   10 ml





  Q12HR JULIO   Administration














- Exam


General - other findings: lethargic on non-rebreather


Eye: PERRL, anicteric sclera


ENT: normocephalic atraumatic, no oropharyngeal lesions


Heart: RRR, no murmur, no gallops, no rubs


Respiratory: CTAB, no wheezes, no rales, no ronchi


Gastrointestinal: soft, non-tender, non-distended, normal bowel sounds


Extremities: no cyanosis, no clubbing, no edema





Hosp A/P





- Plan





Chest x ray 1/9: stable changes


Chest  Xray 1/16: worsening lung aeration 





This is an 80 year old male who presented to the hospital with hypoxia and 

altered mental status. He is COVID+








Acute hypoxic respiratory failure secondary to COVID  pneumonia


- continue IV cefepime.  BNP  > 3000. Chest Xray shows interstitial changes. 

Ordered 40 mg IV lasix x 1 on 1/7, however creatinine worsened so further lasix 

held.  Repeat BNP shows improvement to > 1000


- continue vancomycin, meropenem and dexamethasone.  Micafungin added 1/16.  WBC

increasing to 22.  Will decrease steroid to 2 mg daily 


- awaiting acceptance to hospice for tomorrow 


 





Dementia


- patient non-verbal, refusing to eat.  Patient  to transition to hospice





Hypernatremia


- sodium improved to 147





Hyperkalemia


- potassium up to 5.4. Discontinued PPN with electrolytes. Will repeat BMP


 





HERMAN - secondary to dehydration 


- creatinine improving to 1.58, continue D5W








Hyperglycemia


- continue insulin sliding scale. Likely from steroids. Will reduce 

dexamethasone dose





Dysphagia


- patient is NPO on PPN








Elevated troponin


- mild elevation, downtrending. No chest pain, will monitor 











Hypertension  - controlled


- continue coreg








Dispo: poor prognosis, hopefully can be discharged to home hospice tomorrow

## 2021-01-18 VITALS — SYSTOLIC BLOOD PRESSURE: 160 MMHG | DIASTOLIC BLOOD PRESSURE: 84 MMHG | TEMPERATURE: 97.8 F

## 2021-01-18 LAB
ANION GAP SERPL CALC-SCNC: 17 MMOL/L (ref 10–20)
BUN SERPL-MCNC: 68 MG/DL (ref 8.4–25.7)
CALCIUM SERPL-MCNC: 7.8 MG/DL (ref 7.8–10.44)
CHLORIDE SERPL-SCNC: 115 MMOL/L (ref 98–107)
CO2 SERPL-SCNC: 16 MMOL/L (ref 23–31)
CREAT CL PREDICTED SERPL C-G-VRATE: 42 ML/MIN (ref 70–130)
GLUCOSE SERPL-MCNC: 327 MG/DL (ref 83–110)
HGB BLD-MCNC: 16 G/DL (ref 14–18)
MCH RBC QN AUTO: 32.7 PG (ref 27–31)
MCV RBC AUTO: 101 FL (ref 78–98)
PLATELET # BLD AUTO: 60 THOU/UL (ref 130–400)
POTASSIUM SERPL-SCNC: 5.6 MMOL/L (ref 3.5–5.1)
RBC # BLD AUTO: 4.88 MILL/UL (ref 4.7–6.1)
SODIUM SERPL-SCNC: 142 MMOL/L (ref 136–145)
VANCOMYCIN TROUGH SERPL-MCNC: 13.1 UG/ML
WBC # BLD AUTO: 22.5 THOU/UL (ref 4.8–10.8)

## 2021-01-18 RX ADMIN — MEROPENEM AND SODIUM CHLORIDE SCH MLS: 1 INJECTION, SOLUTION INTRAVENOUS at 12:19

## 2021-01-18 RX ADMIN — INSULIN LISPRO PRN UNIT: 100 INJECTION, SOLUTION INTRAVENOUS; SUBCUTANEOUS at 17:54

## 2021-01-18 RX ADMIN — LEVETIRACETAM SCH MLS: 5 INJECTION INTRAVENOUS at 09:03

## 2021-01-18 RX ADMIN — INSULIN LISPRO PRN UNIT: 100 INJECTION, SOLUTION INTRAVENOUS; SUBCUTANEOUS at 05:44

## 2021-01-18 RX ADMIN — INSULIN GLARGINE SCH MLS: 100 INJECTION, SOLUTION SUBCUTANEOUS at 09:02

## 2021-01-18 RX ADMIN — MEROPENEM AND SODIUM CHLORIDE SCH MLS: 1 INJECTION, SOLUTION INTRAVENOUS at 00:21

## 2021-01-18 RX ADMIN — INSULIN LISPRO PRN UNIT: 100 INJECTION, SOLUTION INTRAVENOUS; SUBCUTANEOUS at 12:18

## 2021-01-18 RX ADMIN — Medication SCH ML: at 09:03

## 2021-01-18 RX ADMIN — HEPARIN SODIUM SCH: 5000 INJECTION, SOLUTION INTRAVENOUS; SUBCUTANEOUS at 10:33

## 2021-01-18 RX ADMIN — HEPARIN SODIUM SCH: 5000 INJECTION, SOLUTION INTRAVENOUS; SUBCUTANEOUS at 13:10

## 2021-01-18 NOTE — PDOC.DS.DS
Provider





- Provider


Date of Admission: 


01/07/21 00:44





Date of Discharge: 01/18/21


Admitting Provider: 


Eric Briones MD





Primary Care Physician: 


OUT OF TOWN








Course





- Hospital Course


Hospital Course: 





Discharge Diagnoses: 


1. Acute hypoxic respiratory failure secondary to COVID


2. HERMAN


3. Leukocytosis


4. Hyperkalemia


5. Hyperglycemia


6. Dysphagia


7. Elevated troponin





Hospital Course: 


This is an 80 year old male with history of dementia who was transferred from 

[place] due to altered mental status. He tested COVID +. He was found to 

have left upper lobe pneumonia. THe patient received IV vanc, cefepime and IV 

steroids. 











#Acute hypoxic respiratory failure secondary to COVID  pneumonia: 


#Hypernatremia


#Dementia


#Hyperkalemia


#HERMAN


#Hyperglycemia 


#Dysphagia


#elevated troponin


- the patient was started on IV steroids and IV antibiotics. He was initially on

nasal cannula but his respiratory status continued to worsen. On 1/13, the 

patient was started on vancomycin and meropenem due to worsening pneumonia on X 

ray and worsening leukocytosis. THe patient's WBC increased to 22.5. Micafungin 

was added on 1/16 and his steroid dose was decreased with no improvement. THe 

patient also developed hypernatremia with sodium increasing to 150. Lasix was 

given once due to a BNP of > 3000, but further lasix doses were held due to 

worsening kidney function. The patient was started on D5W and PPN with 

resolution of his hypernatremia. His creatinine improved from 2.5 to 1.5.  

Speech therapy evaluated the patient while he was in the hospital.  At one point

the patient was upgraded to a pured diet, however after the patient's 

oxygenation worsened, he was downgraded to n.p.o.  The patient has been 

nonverbal during his entire hospital stay.  He has become progressively more 

lethargic in the past few days and is on a 15L venturi mask currently.  

Palliative care was consulted, and the patient will be discharged with home 

hospice after discussion with his family.  He will be discharged on PPN and all 

other medicines will be discontinued. 


 Of note, the patient's potassium has worsened to 5.6.  His PPN was switched to 

PPN without electrolytes to help with this.  


 





Pertinent Studies: 








Chest X ray 1/7: no significant change. Stable multifocal interstitial and 

alveolar opacities


Chest Xray 1/7: no significant change 


Chest x ray 1/16: slight worsening lung aeration 





Resuscitation Status: 








01/11/21 14:09


Resuscitation Status Routine 


   Resuscitation Status: DNAR: NO Resuscitation


   Discussed with: Yomaira Montelongo











- Labs


Lab Results: 


                                        





                                 01/18/21 04:32 





                                 01/18/21 04:32 





Abnormal Lab Results - Last 48 hrs





01/16/21 19:22: B-Natriuretic Peptide 1219.7 H


01/17/21 04:26: Sodium 147 H, Potassium 5.4 H, Chloride 118 H, Carbon Dioxide 19

L, BUN 70 H, Creatinine 1.58 H


01/17/21 04:26: WBC 22.0 H, RBC 4.56 L, MCV 98.8 H, MCH 32.8 H, Plt Count 72 L, 

MPV 12.6 H


01/17/21 16:35: Potassium 5.3 H, Chloride 117 H, Carbon Dioxide 18 L, BUN 67 H, 

Creatinine 1.58 H


01/18/21 04:32: Potassium 5.6 H, Chloride 115 H, Carbon Dioxide 16 L, BUN 68 H, 

Creatinine 1.56 H


01/18/21 04:32: WBC 22.5 H, .0 H, MCH 32.7 H, Plt Count 60 L, MPV 12.8 H





Microbiology - Entire Visit





01/08/21 06:37   Urine voided   Urine Culture - Final


                            NO GROWTH AT 48 HOURS











- Physical Exam


Vitals: 


                             Vital Signs (12 hours)











  Temp Pulse Resp BP Pulse Ox


 


 01/18/21 16:00  97.8 F  85  22 H  160/84 H  85 L


 


 01/18/21 11:00  97.6 F  80  22 H  127/72  100


 


 01/18/21 08:00  97.6 F  96  20  155/88 H  96


 


 01/18/21 06:51   96  19  130/84  100








                                     Weight











Admit Weight                   174 lb


 


Weight                         181 lb 1 oz














Physical Exam: 


The patient was seen and examined on the day of discharge.





General: patient lethargic, on a non-rebreather. Appears malnourished


CV: RRR, no murmurs, rubs, gallops


Lungs: CTAB


Abdomen: +BS, soft, nontender, nondistended


Extremities: no edema








Problem





- Time spent with Patient


(mins): 30





Plan





- Discharge Medications


Prescriptions: 


Amino Acids 4.25 %/Dextrose 5% [Clinimix 4.25/5] 1 ml IV INF #7 bag


Home Medications: 


                                        











 Medication  Instructions  Recorded  Confirmed  Type


 


Amino Acids 4.25 %/Dextrose 5% 1 ml IV INF #7 bag 01/18/21  Rx





[Clinimix 4.25/5]    











Allergies: 








Penicillins Allergy (Verified 01/07/21 02:55)


   


   per granddaughter  


Statins-Hmg-Coa Reductase Inhibitor Adverse Reaction (Verified 01/07/21 02:55)


   


   makes him feel bad 








- Discharge Instructions


Discharge Instructions:: 


home with [facility] hospice


Activity:: Activity as Tolerated


Additional Dietary Instructions:: PPN without electrolytes





- Follow up Plan


Referrals: 


[facility] PHYSICIAN,OUT OF [Primary Care Provider] - 


Disposition: HOME





Quality





- Care Measures


CORE MEASURES:: N/A

## 2021-01-20 NOTE — PQF
Dear : Vicenta Ramos         Date  1/20/2021

Please exercise your independent, professional judgment in responding to the 
clarification form. 

Clinical indicators are provided on the bottom of this form for your review



Can you please further clarify the type of CHF exacerbation?



Please check appropriate box(es):

HEART FAILURE:

[  ] Systolic / HFrEF           

[  ] Diastolic / HfpEF

[  ] Combined Systolic / Diastolic  

[  ] Other diagnosis please specify ___________

[X  ] Unable to determine



Physician Signature:                         Date/Time:



For continuity of documentation, please document condition throughout progress 
notes and discharge summary.  Thank You.



To be completed by CDI/Coding staff for physician review: 







 Present Clinical Indicators - Signs / Symptoms / Labs Results and Location in 

Medical Record

 

[ x ] BNP: 3647.0H, 1219.7H Laboratory

 

[ x ] Representing interstitial edema due to CHF H and P pg.1

 

[ x ] Extremities: no cyanosis, no clubbing, no edema H and P pg.2

 

[ x ] Chest x ray shows some congestion H and P pg.3

 

[ x ] CHF exacerbation H and P pg.4

 

[ x ] Cardiomegaly Chesy X ray 1/9

 

Present Risk Factors                                                            
             Results and Location in Medical Record

 

[ x ] Afib H and P pg.1

 

[ x ] HTN H and P pg.1

 

[ x ] 80 years old H and P pg.1

 

[ x ] NSTEMI H and P pg.3

 

[ x ] COVID Pneumonia H and P pg.3

 

Present Treatments                                                              
               Results and Location in Medical Record

 

[ x ] IV Lasix 40mg SLOW IV MAR

 

[ x ] Oxygen supplementation H and P pg.1

 

[ x ] BNP Monitoring  Laboratory

 

[ x ] Chest X ray  Chest X ray  1/7





CDS/ Signature: Fernando Anne        Phone #: [phone]       Date 1/20/21



                 This is a permanent part of the Medical Record

[facility]

## 2021-01-20 NOTE — PQF
Dear : Vicenta Ramos         Date  1/20/2021

Please exercise your independent, professional judgment in responding to the 
clarification form. 

Clinical indicators are provided on the bottom of this form for your review



Can you please further clarify the nutritional status of the patient?

Please check appropriate box(es):

[ X ] Protein Calorie Malnutrition:    [  ] Mild      [  ] Moderate   [ X ] 
Severe   

[  ] Other Malnutrition (please specify) 
_________________________________________

[  ] Underweight without malnutrition

[  ] Cachexia 

[  ] Other diagnosis ___________

[  ] Unable to determine



Physician Signature:                         Date/Time:



For continuity of documentation, please document condition throughout progress 
notes and discharge summary.  Thank You.



To be completed by CDI/Coding staff for physician review: 







 Present Clinical Indicators - Signs / Symptoms / Labs Results and Location in 

Medical Record

 

[ x ] BMI 24.6 Nutritional assessment 1/15

 

[ x ] Protein calorie malnutrition Hospitalist PN  pg.5 1/12

 

[ x ] Weight 77.66 Nutritional assessment 1/15

 

[ x ] Patient is severely deconditioned and has not been eating Hospitalist PN  
pg.5 1/12

 

[ x ] His overall prognosis is very poor Hospitalist PN  pg.5 1/12

 

[ x ] More lethargic in the past few days DS pg.1

 

Present Risk Factors                                                            
             Results and Location in Medical Record

 

[ x ] Afib H and P pg.1

 

[ x ] HTN H and P pg.1

 

[ x ] 80 years old H and P pg.1

 

[ x ] Dementia H and P pg.1

 

[ x ] NSTEMI H and P pg.3

 

[ x ] COVID Pneumonia H and P pg.3

 

[ x ] CHF H and P pg.3

 

Present Treatments                                                              
               Results and Location in Medical Record

 

[ x ] Dietary consult  Nutritional assessment 1/15

 

[ x ] Nutritional supplements Nutritional assessment 1/15

 

[ x ] Amino acid 4.25mg IV MAR

 

[ x ] IV Fluids MAR

 

[ x ] Going home with peripheral nutrition Hospitalist PN  pg.5 1/12





CDS/ Signature: Fernando Anne        Phone #: [phone]       Date 1/20/2021



Moderate Malnutrition (in acute illness)

?   Energy Intake: <75% of estimated energy requirement for > 7 days

?   Weight Loss:  1-2%/1 week; 5%/ 1 month; 7.5%/3 months

?   Other: mild body fat loss; mild muscle mass loss; mild fluid accumulation; 

Severe Malnutrition (in acute illness)

?   Energy Intake: ? 50% of estimated energy requirement for ? 5 days

?   Weight Loss: >2%/1 week; >5%/1 month; >7.5%/3 months

?   Other: moderate body fat loss; moderate muscle mass loss; moderate- severe 
fluid accumulation; measurably reduced  strength

Moderate Malnutrition (in chronic illness)

?   Energy Intake: <75% of estimated energy requirement for ?1 month

?   Weight Loss: 5%/1 month; 7.5%/3 months; 10%/6 months; 20%/1 year

?   Other: mild body fat loss; mild muscle mass loss; mild fluid accumulation

Severe Malnutrition (in chronic illness)

?   Energy Intake: ?75% of estimated energy requirement for ?1 month

?   Weight Loss: >5%/1 month; >7.5%/3 months; >10%/6 months; >20%/1 year

?   Other: severe body fat loss; severe muscle mass loss; severe fluid 
accumulation; measurably reduced  strength

                 This is a permanent part of the Medical Record

MTDD

## 2021-01-24 NOTE — EKG
Test Reason : STAT

Blood Pressure : ***/*** mmHG

Vent. Rate : 110 BPM     Atrial Rate : 101 BPM

   P-R Int : 000 ms          QRS Dur : 154 ms

    QT Int : 360 ms       P-R-T Axes : 000 036 213 degrees

   QTc Int : 487 ms

 

Electronic ventricular pacemaker

No previous ECGs available

Confirmed by HARSHIL SALEH MD ([id number]) on 1/24/2021 7:23:30 PM

 

Referred By: TYRONE LIRA           Confirmed By:HARSHIL SALEH MD